# Patient Record
Sex: FEMALE | Race: WHITE
[De-identification: names, ages, dates, MRNs, and addresses within clinical notes are randomized per-mention and may not be internally consistent; named-entity substitution may affect disease eponyms.]

---

## 2018-01-02 ENCOUNTER — HOSPITAL ENCOUNTER (EMERGENCY)
Dept: HOSPITAL 25 - ED | Age: 53
Discharge: HOME | End: 2018-01-02
Payer: COMMERCIAL

## 2018-01-02 VITALS — DIASTOLIC BLOOD PRESSURE: 81 MMHG | SYSTOLIC BLOOD PRESSURE: 173 MMHG

## 2018-01-02 DIAGNOSIS — R11.10: ICD-10-CM

## 2018-01-02 DIAGNOSIS — K21.9: Primary | ICD-10-CM

## 2018-01-02 DIAGNOSIS — Z87.891: ICD-10-CM

## 2018-01-02 DIAGNOSIS — F41.9: ICD-10-CM

## 2018-01-02 LAB
HCT VFR BLD AUTO: 38 % (ref 35–47)
HGB BLD-MCNC: 12.1 G/DL (ref 12–16)
INR PPP/BLD: 1.1 (ref 0.77–1.02)
MCH RBC QN AUTO: 23 PG (ref 27–31)
MCHC RBC AUTO-ENTMCNC: 32 G/DL (ref 31–36)
MCV RBC AUTO: 72 FL (ref 80–97)
PLATELET # BLD AUTO: 198 10^3/UL (ref 150–450)
RBC # BLD AUTO: 5.3 10^6/UL (ref 4–5.4)
WBC # BLD AUTO: 13.9 10^3/UL (ref 3.5–10.8)

## 2018-01-02 PROCEDURE — 81015 MICROSCOPIC EXAM OF URINE: CPT

## 2018-01-02 PROCEDURE — 82550 ASSAY OF CK (CPK): CPT

## 2018-01-02 PROCEDURE — 83690 ASSAY OF LIPASE: CPT

## 2018-01-02 PROCEDURE — 83880 ASSAY OF NATRIURETIC PEPTIDE: CPT

## 2018-01-02 PROCEDURE — 99283 EMERGENCY DEPT VISIT LOW MDM: CPT

## 2018-01-02 PROCEDURE — 80053 COMPREHEN METABOLIC PANEL: CPT

## 2018-01-02 PROCEDURE — 85730 THROMBOPLASTIN TIME PARTIAL: CPT

## 2018-01-02 PROCEDURE — 82150 ASSAY OF AMYLASE: CPT

## 2018-01-02 PROCEDURE — 83735 ASSAY OF MAGNESIUM: CPT

## 2018-01-02 PROCEDURE — 36415 COLL VENOUS BLD VENIPUNCTURE: CPT

## 2018-01-02 PROCEDURE — 85610 PROTHROMBIN TIME: CPT

## 2018-01-02 PROCEDURE — 86140 C-REACTIVE PROTEIN: CPT

## 2018-01-02 PROCEDURE — 93005 ELECTROCARDIOGRAM TRACING: CPT

## 2018-01-02 PROCEDURE — 71045 X-RAY EXAM CHEST 1 VIEW: CPT

## 2018-01-02 PROCEDURE — 84484 ASSAY OF TROPONIN QUANT: CPT

## 2018-01-02 PROCEDURE — 83605 ASSAY OF LACTIC ACID: CPT

## 2018-01-02 PROCEDURE — 81003 URINALYSIS AUTO W/O SCOPE: CPT

## 2018-01-02 PROCEDURE — 85025 COMPLETE CBC W/AUTO DIFF WBC: CPT

## 2018-01-02 NOTE — RAD
Indication: Gastroesophageal reflux. Green emesis. Tobacco use.



Comparison: May 15, 2017 chest radiograph and July 13, 2015 abdomen CT.



Technique: RIGHT AP 1655 hours



Report: Obesity limits image quality. Upper normal heart size. Prominent ill-defined

central pulmonary vasculature and bilateral alveolar opacities and prominence of the

interstitial markings. Grossly clear pleural spaces. Negative for pneumothorax. Negative

for free air beneath the diaphragm.



IMPRESSION: The constellation of findings favors cardiogenic pulmonary edema. Correlate

with clinical assessment as bronchopneumonia could have a similar appearance.

## 2018-01-04 NOTE — ED
Mainor HARDWICK Gabriel, scribshelley for Hanh Ratliff MD on 01/02/18 at 1928 .





GI/ HPI





- HPI Summary


HPI Summary: 





This patient is a 52 year old F presenting to [facility] accompanied by her brother 

with a chief complaint of GERD exacerbation that began 10 days ago. The patient 

rates the pain 2/10 in severity. Patient reports anxiety, green emesis, and 

RIVERO. Patient denies difficulty breathing. The patient reports she has gas 

attacks. She started Prozac 2 weeks ago, takes Prilosec for GERD (BID), and 

takes a diuretic when she feels bloated. 





- History of Current Complaint


Chief Complaint: EDGeneral


Time Seen by Provider: 01/02/18 19:11


Stated Complaint: GENERAL ILLNESS


Hx Obtained From: Patient


Onset/Duration: Started Days Ago - 10, Still Present


Timing: Constant


Severity: Mild


Current Severity: Mild


Pain Intensity: 2


Associated Signs and Symptoms: Positive: Other: - anxiety, green emesis, and RIVERO





- Allergy/Home Medications


Allergies/Adverse Reactions: 


 Allergies











Allergy/AdvReac Type Severity Reaction Status Date / Time


 


Penicillins Allergy Severe Swelling Verified 07/12/15 21:47














PMH/Surg Hx/FS Hx/Imm Hx


Endocrine/Hematology History: Reports: Hx Diabetes, Hx Thyroid Disease


Cardiovascular History: Reports: Hx Congestive Heart Failure, Hx Hypertension - 

ON MEDICATION, Other Cardiovascular Problems/Disorders - hx of cardiomyopathy


Respiratory History: 


   Denies: Hx Asthma, Hx Chronic Obstructive Pulmonary Disease (COPD)


GI History: Reports: Hx Gastroesophageal Reflux Disease


Musculoskeletal History: Reports: Hx Arthritis - back, hands, feet


Sensory History: Reports: Hx Contacts or Glasses


Opthamlomology History: Reports: Hx Contacts or Glasses


Infectious Disease History: No


Infectious Disease History: 


   Denies: Traveled Outside the [place] in Last 30 Days





- Family History


Known Family History: Positive: Cardiac Disease, Hypertension, Diabetes, Other 

- cancer 


   Negative: Renal Disease, Seizure Disorder





- Social History


Alcohol Use: None


Substance Use Type: Reports: None


Smoking Status (MU): Former Smoker


Amount Used/How Often: 1/2ppd


Length of Time of Smoking/Using Tobacco: 17yrs total had stop period of time


Have You Smoked in the Last Year: Yes





Review of Systems


Positive: Vomiting - that is green , Other - GERD 


Positive: Anxious


All Other Systems Reviewed And Are Negative: Yes





Physical Exam





- Summary


Physical Exam Summary: 





VITAL SIGNS: Reviewed.


GENERAL:  Patient is a morbidly obese female who is lying comfortable in the 

stretcher. Patient is not in any acute respiratory distress.


HEAD AND FACE: No signs of trauma. No ecchymosis, hematomas or skull 

depressions. No sinus tenderness.


EYES: PERRLA, EOMI x 2, No injected conjunctiva, no nystagmus.


EARS: Hearing grossly intact. Ear canals and tympanic membranes are within 

normal limits.


MOUTH: Oropharynx within normal limits.


NECK: Supple, trachea is midline, no adenopathy, no JVD, no carotid bruit, no c-

spine tenderness, neck with full ROM.


CHEST: Symmetric, no tenderness at palpation


LUNGS: decreased breath sounds bilaterally


CVS: Regular rate and rhythm, S1 and S2 present, no murmurs or gallops 

appreciated.


ABDOMEN: Soft with epigastric tenderness. No signs of distention. No rebound no 

guarding, and no masses palpated. Bowel sounds are normal.


EXTREMITIES: FROM in all major joints, no edema, no cyanosis or clubbing.


NEURO: Alert and oriented x 3. No acute neurological deficits. Speech is normal 

and follows commands.


SKIN: Dry and warm





Triage Information Reviewed: Yes


Vital Signs On Initial Exam: 


 Initial Vitals











Temp Pulse Resp BP Pulse Ox


 


 97.8 F   82   20   207/114   96 


 


 01/02/18 13:20  01/02/18 13:20  01/02/18 13:20  01/02/18 13:20  01/02/18 13:20











Vital Signs Reviewed: Yes





- Jayna Coma Scale


Coma Scale Total: 15





Diagnostics





- Vital Signs


 Vital Signs











  Temp Pulse Resp BP Pulse Ox


 


 01/02/18 19:00   71  20  167/74  95


 


 01/02/18 18:30   74  24  167/77  93


 


 01/02/18 18:00   70  13  179/79  94


 


 01/02/18 17:20   71  15  188/92  95


 


 01/02/18 15:10  98.7 F  72  18  175/87  96


 


 01/02/18 13:20  97.8 F  82  20  207/114  96














- Laboratory


Lab Results: 


 Lab Results











  01/02/18 01/02/18 01/02/18 Range/Units





  16:25 16:25 16:25 


 


WBC     (3.5-10.8)  10^3/ul


 


RBC     (4.0-5.4)  10^6/ul


 


Hgb     (12.0-16.0)  g/dl


 


Hct     (35-47)  %


 


MCV     (80-97)  fL


 


MCH     (27-31)  pg


 


MCHC     (31-36)  g/dl


 


RDW     (10.5-15)  %


 


Plt Count     (150-450)  10^3/ul


 


MPV     (7.4-10.4)  um3


 


Immature Gran %     (0-9)  %


 


Neutrophils %     (38-83)  %


 


Band Neutrophils %     (0-8)  %


 


Lymphocytes %     (25-47)  %


 


Monocytes %     (0-13)  %


 


Abs Neuts (Manual)     (1.5-7.7)  10^3/ul


 


Normal RBC Morphology     


 


Microcytosis     


 


Macrocytosis     


 


INR (Anticoag Therapy)  1.10 H    (0.77-1.02)  


 


APTT  30.5    (26.0-36.3)  seconds


 


Sodium   135   (133-145)  mmol/L


 


Potassium   4.0   (3.5-5.0)  mmol/L


 


Chloride   99 L   (101-111)  mmol/L


 


Carbon Dioxide   30   (22-32)  mmol/L


 


Anion Gap   6   (2-11)  mmol/L


 


BUN   10   (6-24)  mg/dL


 


Creatinine   0.72   (0.51-0.95)  mg/dL


 


Est GFR ( Amer)   110.3   (>60)  


 


Est GFR (Non-Af Amer)   85.7   (>60)  


 


BUN/Creatinine Ratio   13.9   (8-20)  


 


Glucose   149 H   ()  mg/dL


 


Lactic Acid     (0.5-2.0)  mmol/L


 


Calcium   9.3   (8.6-10.3)  mg/dL


 


Magnesium   2.4   (1.9-2.7)  mg/dL


 


Total Bilirubin   1.70 H   (0.2-1.0)  mg/dL


 


AST   19   (13-39)  U/L


 


ALT   14   (7-52)  U/L


 


Alkaline Phosphatase   67   ()  U/L


 


Total Creatine Kinase   65   ()  U/L


 


Troponin I   0.00   (<0.04)  ng/mL


 


C-Reactive Protein   24.24 H   (< 5.00)  mg/L


 


B-Natriuretic Peptide    370 H ( - 100) pg/mL


 


Total Protein   6.9   (6.4-8.9)  g/dL


 


Albumin   3.9   (3.2-5.2)  g/dL


 


Globulin   3.0   (2-4)  g/dL


 


Albumin/Globulin Ratio   1.3   (1-3)  


 


Amylase   19 L   ()  U/L


 


Lipase   65   (11.0-82.0)  U/L


 


Urine Color     


 


Urine Appearance     


 


Urine pH     (5-9)  


 


Ur Specific Gravity     (1.010-1.030)  


 


Urine Protein     (Negative)  


 


Urine Ketones     (Negative)  


 


Urine Blood     (Negative)  


 


Urine Nitrate     (Negative)  


 


Urine Bilirubin     (Negative)  


 


Urine Urobilinogen     (Negative)  


 


Ur Leukocyte Esterase     (Negative)  


 


Urine WBC (Auto)     (Absent)  


 


Urine RBC (Auto)     (Absent)  


 


Ur Squamous Epith Cells     (Absent)  


 


Urine Bacteria     (Absent)  


 


Urine Glucose     (Negative)  














  01/02/18 01/02/18 01/02/18 Range/Units





  16:25 16:25 17:10 


 


WBC  13.9 H    (3.5-10.8)  10^3/ul


 


RBC  5.30    (4.0-5.4)  10^6/ul


 


Hgb  12.1    (12.0-16.0)  g/dl


 


Hct  38    (35-47)  %


 


MCV  72 L    (80-97)  fL


 


MCH  23 L    (27-31)  pg


 


MCHC  32    (31-36)  g/dl


 


RDW  17 H    (10.5-15)  %


 


Plt Count  198    (150-450)  10^3/ul


 


MPV  8    (7.4-10.4)  um3


 


Immature Gran %  9    (0-9)  %


 


Neutrophils %  76    (38-83)  %


 


Band Neutrophils %  9 H    (0-8)  %


 


Lymphocytes %  12 L    (25-47)  %


 


Monocytes %  3    (0-13)  %


 


Abs Neuts (Manual)  11.8 H    (1.5-7.7)  10^3/ul


 


Normal RBC Morphology  Not Reportable    


 


Microcytosis  1+    


 


Macrocytosis  1+    


 


INR (Anticoag Therapy)     (0.77-1.02)  


 


APTT     (26.0-36.3)  seconds


 


Sodium     (133-145)  mmol/L


 


Potassium     (3.5-5.0)  mmol/L


 


Chloride     (101-111)  mmol/L


 


Carbon Dioxide     (22-32)  mmol/L


 


Anion Gap     (2-11)  mmol/L


 


BUN     (6-24)  mg/dL


 


Creatinine     (0.51-0.95)  mg/dL


 


Est GFR ( Amer)     (>60)  


 


Est GFR (Non-Af Amer)     (>60)  


 


BUN/Creatinine Ratio     (8-20)  


 


Glucose     ()  mg/dL


 


Lactic Acid   0.9   (0.5-2.0)  mmol/L


 


Calcium     (8.6-10.3)  mg/dL


 


Magnesium     (1.9-2.7)  mg/dL


 


Total Bilirubin     (0.2-1.0)  mg/dL


 


AST     (13-39)  U/L


 


ALT     (7-52)  U/L


 


Alkaline Phosphatase     ()  U/L


 


Total Creatine Kinase     ()  U/L


 


Troponin I     (<0.04)  ng/mL


 


C-Reactive Protein     (< 5.00)  mg/L


 


B-Natriuretic Peptide    ( - 100) pg/mL


 


Total Protein     (6.4-8.9)  g/dL


 


Albumin     (3.2-5.2)  g/dL


 


Globulin     (2-4)  g/dL


 


Albumin/Globulin Ratio     (1-3)  


 


Amylase     ()  U/L


 


Lipase     (11.0-82.0)  U/L


 


Urine Color    Yellow  


 


Urine Appearance    Clear  


 


Urine pH    7.0  (5-9)  


 


Ur Specific Gravity    1.010  (1.010-1.030)  


 


Urine Protein    1+(30 mg/dl) H  (Negative)  


 


Urine Ketones    Negative  (Negative)  


 


Urine Blood    Negative  (Negative)  


 


Urine Nitrate    Negative  (Negative)  


 


Urine Bilirubin    Negative  (Negative)  


 


Urine Urobilinogen    Positive H  (Negative)  


 


Ur Leukocyte Esterase    Negative  (Negative)  


 


Urine WBC (Auto)    Trace(0-5/hpf)  (Absent)  


 


Urine RBC (Auto)    Trace(0-2/hpf)  (Absent)  


 


Ur Squamous Epith Cells    Present H  (Absent)  


 


Urine Bacteria    Absent  (Absent)  


 


Urine Glucose    Negative  (Negative)  











Result Diagrams: 


 01/02/18 16:25





 01/02/18 16:25


Lab Statement: Any lab studies that have been ordered have been reviewed, and 

results considered in the medical decision making process.





- Radiology


  ** CXR


Radiology Interpretation Completed By: Radiologist - The constellation of 

findings favors cardiogenic pulmonary edema. Correlate with clinical assessment 

as bronchopneumonia could have a similar appearance. ED physician has reviewed 

this radiology report.





- EKG


  ** 16:32


Cardiac Rate: NL


EKG Rhythm: Sinus Rhythm - at 77 BPM


EKG Interpretation: Normal axis. Normal interval. No ischemic changes. Q waves 

in inferior lead





Re-Evaluation





- Re-Evaluation


  ** First Eval


Re-Evaluation Time: 20:49


Change: Unchanged - Discussed CXR, she does not want to be admitted. She is on 

a diuretic she will go home and take follow up with PCP kirby and will get a 

cardiology consult  for echo.





GIGU Course/Dx





- Course


Assessment/Plan: This patient is a 52 year old F presenting to [facility] 

accompanied by her brother with a chief complaint of GERD exacerbation that 

began 10 days ago. The patient rates the pain 2/10 in severity. Patient reports 

anxiety, green emesis, and RIVERO. Patient denies difficulty breathing. The 

patient reports she has gas attacks. She started Prozac 2 weeks ago, takes 

Prilosec for GERD (BID), and takes a diuretic when she feels bloated.  An EKG 

reveals Normal axis. Normal interval. No ischemic changes.  CXR reveals, per 

radiologist, The constellation of findings favors cardiogenic pulmonary edema. 

Correlate.  with clinical assessment as bronchopneumonia could have a similar 

appearance.  Test results with no significant abnormalities.  In the ED course 

the patient was given Maalox, Zofran, and xylocaine.  Patient will be 

discharged with prescription for Zofran and follow up from PCP.  The patient is 

agreeable with this plan.





- Diagnoses


Provider Diagnoses: 


 GERD (gastroesophageal reflux disease)








Discharge





- Discharge Plan


Condition: Stable


Disposition: HOME


Prescriptions: 


Ondansetron [Zofran Odt] 8 mg PO TID PRN #20 tab


 PRN Reason: Nausea/Vomiting


Ondansetron HCl [Zofran 8 MG TAB] 8 mg PO TID PRN #20 tab


 PRN Reason: Nausea/Vomiting


Patient Education Materials:  Ondansetron (By mouth), Gastroesophageal Reflux 

Disease (ED)


Referrals: 


Delvin Jackson MD [Primary Care Provider] - 2 Days


Additional Instructions: 


Follow up with Dr. Jackson and he should refer you to a cardiologist for an 

echo. RETURN TO EMERGENCY DEPARTMENT FOR ANY NEW OR WORSENING SYMPTOMS 





The documentation as recorded by the Mainor nuñez Gabriel accurately reflects 

the service I personally performed and the decisions made by me, Hanh Ratliff MD.

## 2018-05-10 ENCOUNTER — HOSPITAL ENCOUNTER (INPATIENT)
Dept: HOSPITAL 25 - ED | Age: 53
LOS: 1 days | Discharge: HOME | DRG: 720 | End: 2018-05-11
Attending: HOSPITALIST | Admitting: HOSPITALIST
Payer: COMMERCIAL

## 2018-05-10 DIAGNOSIS — K21.9: ICD-10-CM

## 2018-05-10 DIAGNOSIS — E66.01: ICD-10-CM

## 2018-05-10 DIAGNOSIS — Z79.82: ICD-10-CM

## 2018-05-10 DIAGNOSIS — B96.20: ICD-10-CM

## 2018-05-10 DIAGNOSIS — I11.0: ICD-10-CM

## 2018-05-10 DIAGNOSIS — F17.210: ICD-10-CM

## 2018-05-10 DIAGNOSIS — Z79.84: ICD-10-CM

## 2018-05-10 DIAGNOSIS — N12: ICD-10-CM

## 2018-05-10 DIAGNOSIS — Z95.5: ICD-10-CM

## 2018-05-10 DIAGNOSIS — N13.30: ICD-10-CM

## 2018-05-10 DIAGNOSIS — B33.24: ICD-10-CM

## 2018-05-10 DIAGNOSIS — M19.042: ICD-10-CM

## 2018-05-10 DIAGNOSIS — M19.041: ICD-10-CM

## 2018-05-10 DIAGNOSIS — E11.9: ICD-10-CM

## 2018-05-10 DIAGNOSIS — M19.072: ICD-10-CM

## 2018-05-10 DIAGNOSIS — A41.9: Primary | ICD-10-CM

## 2018-05-10 DIAGNOSIS — Z83.3: ICD-10-CM

## 2018-05-10 DIAGNOSIS — M46.90: ICD-10-CM

## 2018-05-10 DIAGNOSIS — Z88.1: ICD-10-CM

## 2018-05-10 DIAGNOSIS — E78.5: ICD-10-CM

## 2018-05-10 DIAGNOSIS — K57.90: ICD-10-CM

## 2018-05-10 DIAGNOSIS — M19.071: ICD-10-CM

## 2018-05-10 DIAGNOSIS — I50.9: ICD-10-CM

## 2018-05-10 DIAGNOSIS — Z82.49: ICD-10-CM

## 2018-05-10 DIAGNOSIS — Z80.9: ICD-10-CM

## 2018-05-10 DIAGNOSIS — I25.10: ICD-10-CM

## 2018-05-10 DIAGNOSIS — Z88.0: ICD-10-CM

## 2018-05-10 LAB
BASOPHILS # BLD AUTO: 0.1 10^3/UL (ref 0–0.2)
EOSINOPHIL # BLD AUTO: 0.1 10^3/UL (ref 0–0.6)
HCT VFR BLD AUTO: 37 % (ref 35–47)
HGB BLD-MCNC: 12.2 G/DL (ref 12–16)
LYMPHOCYTES # BLD AUTO: 1 10^3/UL (ref 1–4.8)
MCH RBC QN AUTO: 26 PG (ref 27–31)
MCHC RBC AUTO-ENTMCNC: 33 G/DL (ref 31–36)
MCV RBC AUTO: 79 FL (ref 80–97)
MONOCYTES # BLD AUTO: 0.6 10^3/UL (ref 0–0.8)
NEUTROPHILS # BLD AUTO: 15.7 10^3/UL (ref 1.5–7.7)
NRBC # BLD AUTO: 0 10^3/UL
NRBC BLD QL AUTO: 0
PLATELET # BLD AUTO: 205 10^3/UL (ref 150–450)
RBC # BLD AUTO: 4.72 10^6/UL (ref 4–5.4)
WBC # BLD AUTO: 17.4 10^3/UL (ref 3.5–10.8)

## 2018-05-10 PROCEDURE — 74176 CT ABD & PELVIS W/O CONTRAST: CPT

## 2018-05-10 PROCEDURE — 80048 BASIC METABOLIC PNL TOTAL CA: CPT

## 2018-05-10 PROCEDURE — 83605 ASSAY OF LACTIC ACID: CPT

## 2018-05-10 PROCEDURE — 83036 HEMOGLOBIN GLYCOSYLATED A1C: CPT

## 2018-05-10 PROCEDURE — 87086 URINE CULTURE/COLONY COUNT: CPT

## 2018-05-10 PROCEDURE — 85025 COMPLETE CBC W/AUTO DIFF WBC: CPT

## 2018-05-10 PROCEDURE — 83690 ASSAY OF LIPASE: CPT

## 2018-05-10 PROCEDURE — 80053 COMPREHEN METABOLIC PANEL: CPT

## 2018-05-10 PROCEDURE — 90732 PPSV23 VACC 2 YRS+ SUBQ/IM: CPT

## 2018-05-10 PROCEDURE — 36415 COLL VENOUS BLD VENIPUNCTURE: CPT

## 2018-05-10 PROCEDURE — 81003 URINALYSIS AUTO W/O SCOPE: CPT

## 2018-05-10 PROCEDURE — 99284 EMERGENCY DEPT VISIT MOD MDM: CPT

## 2018-05-10 PROCEDURE — 87077 CULTURE AEROBIC IDENTIFY: CPT

## 2018-05-10 PROCEDURE — 87186 SC STD MICRODIL/AGAR DIL: CPT

## 2018-05-10 PROCEDURE — 81015 MICROSCOPIC EXAM OF URINE: CPT

## 2018-05-10 PROCEDURE — 87040 BLOOD CULTURE FOR BACTERIA: CPT

## 2018-05-10 PROCEDURE — 86140 C-REACTIVE PROTEIN: CPT

## 2018-05-10 RX ADMIN — SODIUM CHLORIDE SCH MLS/HR: 900 IRRIGANT IRRIGATION at 18:11

## 2018-05-10 RX ADMIN — CARVEDILOL SCH MG: 25 TABLET, FILM COATED ORAL at 18:11

## 2018-05-10 RX ADMIN — CARVEDILOL SCH: 25 TABLET, FILM COATED ORAL at 20:55

## 2018-05-10 RX ADMIN — TICAGRELOR SCH MG: 90 TABLET ORAL at 18:11

## 2018-05-10 RX ADMIN — TICAGRELOR SCH: 90 TABLET ORAL at 20:55

## 2018-05-10 RX ADMIN — INSULIN LISPRO SCH UNITS: 100 INJECTION, SOLUTION INTRAVENOUS; SUBCUTANEOUS at 21:10

## 2018-05-10 RX ADMIN — ACETAMINOPHEN PRN MG: 325 TABLET ORAL at 21:09

## 2018-05-10 NOTE — RAD
CLINICAL HISTORY: Right flank pain in a patient with a history of renal stones



COMPARISON: Most recent CT examination is dated July 13, 2015



TECHNIQUE: Noncontrast CT examination of the abdomen and pelvis from the lung bases

through the initial tuberosities.



FINDINGS:



VISUALIZED LUNG BASES: 

The visualized lung bases are grossly clear. There is no pleural effusion.



ABDOMEN AND PELVIS:



Evaluation of the solid organs and vasculature is limited without intravenous contrast.



Overlying the right anterior abdominal wall there is appearance of a lipoma measuring up

to 5.4 x 14 cm in the axial plane with multiple foci of coarse calcification. A

fat-containing umbilical hernia is again seen.



The liver is homogenously hypodense relative to the spleen. The liver measures 20 cm in

greatest cephalocaudal dimension, slightly less than the previous CT examination.



The spleen, pancreas and adrenal glands are grossly normal in appearance. The gallbladder

is normal.



The left kidney normal in appearance without focal mass, calcification or signs of

hydronephrosis. Right kidney exhibits a mild degree of hydronephrosis. There are no

obstructing calculi identified in the right ureter or in the urinary bladder.



Evaluation of the gastrointestinal tract is limited without oral contrast. The small and

large bowel are not distended.The patient's normal appendix is identified in the right

lower quadrant measuring 5 mm in diameter (coronal image 52 and axial image 131) with gas

in the lumen. There are rectosigmoid diverticula but no focal inflammatory change

characteristic of diverticulitis.



There is no gross retroperitoneal or mesenteric lymphadenopathy.



The pelvic viscera is normal in appearance.



The coarsely calcified abdominal aorta and iliac arteries are normal in course and

diameter.



Degenerative changes include multilevel loss of intervertebral disc height involving the

lower thoracic and lumbar spine.There are no sinister bone lesions.



IMPRESSION:

1. There is mild hydronephrosis of the right kidney with mild perinephric stranding but no

obstructing renal calculi is identified. Images could be seen in the setting of recent

passage of renal calculus.

2. Diverticulosis without focal inflammatory changes characteristic of diverticulitis.

3. Likely hepatic steatosis with mild splenomegaly.

4. Additional chronic and degenerative changes described in body the report unlikely to be

directly related to the patient's current presentation.

## 2018-05-11 VITALS — DIASTOLIC BLOOD PRESSURE: 53 MMHG | SYSTOLIC BLOOD PRESSURE: 103 MMHG

## 2018-05-11 LAB
BASOPHILS # BLD AUTO: 0.1 10^3/UL (ref 0–0.2)
EOSINOPHIL # BLD AUTO: 0.2 10^3/UL (ref 0–0.6)
HCT VFR BLD AUTO: 34 % (ref 35–47)
HGB BLD-MCNC: 11.2 G/DL (ref 12–16)
LYMPHOCYTES # BLD AUTO: 2.2 10^3/UL (ref 1–4.8)
MCH RBC QN AUTO: 26 PG (ref 27–31)
MCHC RBC AUTO-ENTMCNC: 33 G/DL (ref 31–36)
MCV RBC AUTO: 79 FL (ref 80–97)
MONOCYTES # BLD AUTO: 0.7 10^3/UL (ref 0–0.8)
NEUTROPHILS # BLD AUTO: 8.3 10^3/UL (ref 1.5–7.7)
NRBC # BLD AUTO: 0 10^3/UL
NRBC BLD QL AUTO: 0
PLATELET # BLD AUTO: 184 10^3/UL (ref 150–450)
RBC # BLD AUTO: 4.25 10^6/UL (ref 4–5.4)
WBC # BLD AUTO: 11.4 10^3/UL (ref 3.5–10.8)

## 2018-05-11 RX ADMIN — INSULIN LISPRO SCH UNITS: 100 INJECTION, SOLUTION INTRAVENOUS; SUBCUTANEOUS at 13:21

## 2018-05-11 RX ADMIN — TICAGRELOR SCH MG: 90 TABLET ORAL at 08:11

## 2018-05-11 RX ADMIN — SODIUM CHLORIDE SCH MLS/HR: 900 IRRIGANT IRRIGATION at 04:12

## 2018-05-11 RX ADMIN — CARVEDILOL SCH MG: 25 TABLET, FILM COATED ORAL at 08:11

## 2018-05-11 RX ADMIN — ACETAMINOPHEN PRN MG: 325 TABLET ORAL at 07:59

## 2018-05-11 RX ADMIN — INSULIN LISPRO SCH UNITS: 100 INJECTION, SOLUTION INTRAVENOUS; SUBCUTANEOUS at 09:26

## 2018-05-11 NOTE — ED
Mainor HARDWICK Gabriel, scribed for Vj Devine MD on 05/10/18 at 1157 .





Abdominal Pain/Male





- HPI Summary


HPI Summary: 





This patient is a 52 year old F presenting to [facility] with a chief complaint of 

right flank pain that began yesterday morning.  The patient rates the pain 8/10 

in severity and states it radiates into her ABD. Patient reports nausea, 

hematuria, dysuria, diaphoresis, and chills. Pt is on abx for her current UTI, 

she states the pain feels similar to a prior kidney stone. Cardiac stent placed 

feb 2018





- History of Current Complaint


Chief Complaint: EDFlankPain


Stated Complaint: N/V,RT FLANK PAIN


Time Seen by Provider: 05/10/18 11:28


Hx Obtained From: Patient


Onset/Duration: Still Present


Timing: Constant


Severity Initially: Severe


Severity Currently: Severe


Pain Intensity: 8


Pain Scale Used: 0-10 Numeric


Location: Flank - r


Radiates: No


Associated Signs And Symptoms: Positive: Nausea





- Allergies/Home Medications


Allergies/Adverse Reactions: 


 Allergies











Allergy/AdvReac Type Severity Reaction Status Date / Time


 


clarithromycin Allergy  Diarrhea Verified 05/10/18 11:42


 


penicillin G Allergy  Swelling Verified 05/10/18 11:42











Home Medications: 


 Home Medications





Aspirin EC TAB* [Ecotrin EC Low Dose 81 MG*] 81 mg PO QAM 05/10/18 [History 

Confirmed 05/10/18]


Atorvastatin* [Lipitor*] 10 mg PO DAILY 05/10/18 [History Confirmed 05/10/18]


Bumetanide TAB* [Bumex 1 MG TAB*] 1 mg PO DAILY 05/10/18 [History Confirmed 05/

10/18]


Carvedilol TAB* [Coreg TAB*] 25 mg PO BID 05/10/18 [History Confirmed 05/10/18]


Glyburid/Metformin 2.5/500(NF) [Glucovance (NF)] 1 tab PO BID 05/10/18 [History 

Confirmed 05/10/18]


Levothyroxine TAB* [Synthroid TAB*] 100 mcg PO DAILY 05/10/18 [History 

Confirmed 05/10/18]


Lisinopril TAB* [Prinivil TAB*] 40 mg PO DAILY 05/10/18 [History Confirmed 05/10

/18]


Nitrofurantoin Monohyd/M-Cryst [Macrobid 100 mg Capsule] 100 mg PO BID 05/10/18 

[History Confirmed 05/10/18]


Omeprazole CAP* [Prilosec CAP* 20 MG] 20 mg PO DAILY 05/10/18 [History 

Confirmed 05/10/18]


Ticagrelor* [Brilinta*] 90 mg PO BID 05/10/18 [History Confirmed 05/10/18]


amLODIPine TAB* [Norvasc 5 mg TAB*] 10 mg PO DAILY 05/10/18 [History Confirmed 

05/10/18]











PMH/Surg Hx/FS Hx/Imm Hx


Endocrine/Hematology History: Reports: Hx Diabetes, Hx Thyroid Disease


Cardiovascular History: Reports: Hx Congestive Heart Failure, Hx Hypertension - 

ON MEDICATION, Other Cardiovascular Problems/Disorders - hx of cardiomyopathy


Respiratory History: 


   Denies: Hx Asthma, Hx Chronic Obstructive Pulmonary Disease (COPD)


GI History: Reports: Hx Gastroesophageal Reflux Disease


Musculoskeletal History: Reports: Hx Arthritis - back, hands, feet


Sensory History: Reports: Hx Contacts or Glasses


Opthamlomology History: Reports: Hx Contacts or Glasses


Infectious Disease History: No


Infectious Disease History: 


   Denies: Traveled Outside the [place] in Last 30 Days





- Family History


Known Family History: Positive: Cardiac Disease, Hypertension, Diabetes, Other 

- cancer 


   Negative: Renal Disease, Seizure Disorder





- Social History


Alcohol Use: None


Substance Use Type: Reports: None


Smoking Status (MU): Former Smoker


Amount Used/How Often: 1/2ppd


Length of Time of Smoking/Using Tobacco: 17yrs total had stop period of time


Have You Smoked in the Last Year: Yes





Review of Systems


Positive: Chills, Skin Diaphoresis.  Negative: Fever


Negative: Erythema


Negative: Sore Throat


Negative: Chest Pain


Negative: Shortness Of Breath, Cough


Positive: Abdominal Pain, Nausea


Positive: dysuria, flank pain - r, hematuria


Negative: Myalgia, Edema


Negative: Rash


Neurological: Negative - dizziness 


All Other Systems Reviewed And Are Negative: Yes





Physical Exam





- Summary


Physical Exam Summary: 








Constitutional: Well-developed, obese, in moderate pain distress 


Skin: Warm, Dry


HENT: Normocephalic; Atraumatic


Eyes: Conjunctiva normal


Neck: Musculoskeletal ROM normal neck. (-) JVD, (-) Stridor, (-) Tracheal 

deviation


Cardio: Rhythm regular, rate normal, Heart sounds normal; Intact distal pulses; 

The pedal pulses are 2+ and symmetric. Radial pulses are 2+ and symmetric. (-) 

Murmur


Pulmonary/Chest wall: Effort normal. (-) Respiratory distress, (-) Wheezes, (-) 

Rales


Abd: Soft, (-) Tenderness, (-) Distension, (-) Guarding, (-) Rebound


Musculoskeletal: (-) Edema


Lymph: (-) Cervical adenopathy


Neuro: Alert, Oriented x3


Psych: Mood and affect Normal


 





Triage Information Reviewed: Yes


Vital Signs On Initial Exam: 


 Initial Vitals











Temp Pulse Resp BP Pulse Ox


 


 97 F   78   16   168/91   99 


 


 05/10/18 10:28  05/10/18 10:28  05/10/18 10:28  05/10/18 10:28  05/10/18 10:28











Vital Signs Reviewed: Yes





Diagnostics





- Vital Signs


 Vital Signs











  Temp Pulse Resp BP Pulse Ox


 


 05/10/18 10:28  97 F  78  16  168/91  99














- Laboratory


Result Diagrams: 


 05/10/18 11:46





 05/10/18 11:39


Lab Statement: Any lab studies that have been ordered have been reviewed, and 

results considered in the medical decision making process.





- Radiology


  ** CT ABD/Pelvis


Radiology Interpretation Completed By: Radiologist - 1. There is mild 

hydronephrosis of the right kidney with mild perinephric stranding but no 

obstructing renal calculi is identified. Images could be seen in the setting of 

recent passage of renal calculus. 2. Diverticulosis without focal inflammatory 

changes characteristic of diverticulitis. 3. Likely hepatic steatosis with mild 

splenomegaly. 4. Additional chronic and degenerative changes described in body 

the report unlikely to be directly related to the patient's current 

presentation. ED physician has reviewed this radiology report.





Re-Evaluation





- Re-Evaluation


  ** First Eval


Re-Evaluation Time: 15:32


Change: Unchanged


Comment: I updated the patient with the plan of care.





Abdominal Pain Fem Course/Dx





- Course


Assessment/Plan: This patient is a 52 year old F presenting to [facility] with a 

chief complaint of right flank pain that began yesterday morning.  The patient 

rates the pain 8/10 in severity and states it radiates into her ABD. Patient 

reports nausea, hematuria, dysuria, diaphoresis, and chills. Pt is on abx for 

her current UTI, she states the pain feels similar to a prior kidney stone. 

Cardiac stent placed feb 2018.  CT ABD/Pelvis reveals, per radiologist, 1. 

There is mild hydronephrosis of the right kidney with mild perinephric 

stranding but no.  obstructing renal calculi is identified. Images could be 

seen in the setting of recent.  passage of renal calculus.  2. Diverticulosis 

without focal inflammatory changes characteristic of diverticulitis.  3. Likely 

hepatic steatosis with mild splenomegaly.  4. Additional chronic and 

degenerative changes described in body the report unlikely to be.  directly 

related to the patient's current presentation.  Test results with no 

significant abnormalities except for a glucose of 186 and WBC 17.4.  In the ED 

course the patient was given zofran and morphine.  We discussed patient care 

with Dr. Monroy and they agreed to admit the patient. The patient will be 

admitted because her outpatient treatment is failing due to her DM.  Patient 

will be admitted.  The patient is agreeable with this plan.





- Diagnoses


Provider Diagnoses: 


 Pyelonephritis








- Provider Notifications


Discussed Care Of Patient With: Gaetano Monroy


Time Discussed With Above Provider: 14:45


Instructed by Provider To: Admit As Inpatient





Discharge





- Sign-Out/Discharge


Documenting (check all that apply): Discharge/Admit/Transfer





- Discharge Plan


Condition: Fair


Disposition: ADMITTED TO [facility]


Referrals: 


Delvin Jackson MD [Primary Care Provider] - 





The documentation as recorded by the Mainor nuñez Gabriel accurately reflects 

the service I personally performed and the decisions made by me, Vj Devine MD.

## 2018-05-11 NOTE — HP
CC:  Dr. Jackson; Dr. Mitchell *

 

HISTORY AND PHYSICAL:

 

DATE OF ADMISSION:  05/10/18

 

TIME OF EVALUATION:  3:45 p.m.

 

PRIMARY CARE PROVIDER:  Dr. Jackson.

 

CARDIOLOGIST:  Dr. Mitchell.

 

CHIEF COMPLAINT:  Right flank pain.

 

HISTORY OF PRESENT ILLNESS:  Ms. Macdonald is a 52-year-old lady with a past 
medical history of morbid obesity with a BMI of 54, diabetes, hypertension, 
history of viral cardiomyopathy, GERD, coronary artery disease status post 
stent who presents to the emergency room with complaints of right flank pain.  
She states that 5 days ago she started to have dysuria and urinary frequency.  
She suspected that she had a urinary tract infection, but did not seek medical 
attention during the weekend. On Monday, she started to have intermittent 
hematuria and decided to see her primary care provider.  In the meantime, she 
developed right flank pain with no radiation, described as moderate-to-severe, 
associated with nausea and vomiting. She was seen at her primary care provider'
s office, had a urine test performed and was diagnosed with urinary tract 
infection.  Nitrofurantoin was prescribed and the patient did not  the 
medication the same day.  She started this antibiotic yesterday and took 2 
doses with no significant improvement what prompted her visit to the emergency 
room today.

 

She denies fever, chills, chest pain, palpitations, shortness of breath, or any 
other complaints not described above.

 

She denies any prior history of nephrolithiasis, but has had uncomplicated 
urinary tract infections in the past.

 

PAST MEDICAL HISTORY:

1.  Morbid obesity with a BMI of 54.

2.  Coronary artery disease.  As per the patient's report, she had a stent 
placed to the LAD in February 2018 by Dr. Camarena at [facility].  She states 
that her RCA was totally occluded and no intervention was performed.  She has 
been on aspirin and Brilinta since.

3.  Type 2 diabetes.

4.  Hypertension.

5.  Hyperlipidemia.

6.  GERD.

7.  Prior history of viral cardiomyopathy.

 

MEDICATION LIST:

1.  Amlodipine 10 mg p.o. daily.

2.  Aspirin 81 mg p.o. daily.

3.  Atorvastatin 10 mg p.o. daily.

4.  Bumetanide 1 mg p.o. daily.

5.  Carvedilol 25 mg p.o. b.i.d.

6.  Glyburide/metformin 2.5/500 one tablet p.o. b.i.d.

7.  Levothyroxine 100 mcg p.o. daily.

8.  Lisinopril 40 mg p.o. daily.

9.  Nitrofurantoin 100 mg p.o. b.i.d.

10.  Omeprazole 20 mg p.o. daily.

11.  Brilinta 90 mg p.o. b.i.d.

 

ALLERGIES:  With PENICILLINS, the patient described reaction of swelling and 
with CLARITHROMYCIN, the patient has diarrhea.  She received ceftriaxone in the 
emergency room with no untoward reaction.

 

FAMILY HISTORY:  Significant for strong family history of coronary artery 
disease.

 

SOCIAL HISTORY:  The patient has a history of tobacco abuse, half-a-pack of 
cigarettes and she is trying to quit.  No history of alcohol or drug use. 
Surrogate decision maker is her brother, Jerry Macdonald, phone number [phone].

 

REVIEW OF SYSTEMS:  A 14-point review of systems was performed and all the 
pertinent negative and positive findings are in the HPI.

 

                               PHYSICAL EXAMINATION

 

GENERAL:  The patient is a pleasant, middle-aged morbidly obese lady, sitting 
up in a chair, in no acute distress.

 

VITAL SIGNS:  Temperature 97.0, heart rate is 78, respiratory rate is 16, 
oxygen saturation is 99% on room air, blood pressure is 168/91.

 

HEENT:  Pupils are equal.  Moist mucous membranes.

 

CHEST:  Breath sounds present bilaterally with no added sounds.

 

CVS:  Normal S1, S2.  Regular rate and rhythm.

 

ABDOMEN:  The patient was able to lie down on the ED stretcher, but abdominal 
examination is limited due to body habitus.  There is no significant tenderness 
on palpation.  Bowel sounds are present and there is right flank tenderness on 
percussion.

 

EXTREMITIES:  Mild bilateral lower extremity pitting edema.

 

NEURO:  She is alert, oriented x3.  Able to move all 4 extremities.

 

 LABORATORY AND IMAGING DATA:  The patient had a CBC that showed a WBC of 17.4, 
hemoglobin of 12.2, hematocrit of 37, platelets of 205, 90% neutrophils.  
Chemistry showed a sodium of 136, potassium of 4, chloride of 100, bicarb of 24
, BUN of 17, anion gap of 12, glucose of 186, calcium of 9.6, lactic acid of 
1.4.  LFTs are normal except for slight elevation of total bilirubin at 1.4.  
CRP is 43. Urinalysis showed 1+ protein, 1+ ketones, 3+ blood, 3+ LE, 3+ wbc's, 
3+ rbc's, squamous epithelial cells are present, and 1+ urine bacteria.

 

CT of the abdomen and pelvis showed mild hydronephrosis of the right kidney 
with mild perinephric stranding, but no obstructing renal calculi is 
identified.  Images could be seen in the setting of recent passage of renal 
calculus.  Diverticulosis without focal inflammatory changes characteristic of 
diverticulitis.  Likely hepatic steatosis with mild splenomegaly.

 

ASSESSMENT AND PLAN:  Ms. Macdonald is a 52-year-old lady with a past medical 
history of morbid obesity, coronary artery disease, status post stent, 
hypertension, hyperlipidemia, type 2 diabetes, who presents to the emergency 
room with complaints of flank pain, dysuria and urinary frequency, found to 
have pyelonephritis.

 

1.  Sepsis.  The patient has leukocytosis and I believe she is not tachycardic 
at this time because she is on a beta-blocker.  Source is pyelonephritis.  
There is no evidence of severe sepsis or septic shock at this time.

2.  Pyelonephritis.  We are going to obtain the urine culture the patient had 
done as an outpatient.  I suspect the urine culture sent here will probably be 
negative as she was already taking antibiotics as an outpatient.  In the 
meantime, she will be continued on IV fluids and ceftriaxone.  She will receive 
symptomatic treatment for her pain and nausea.

3.  Hypertension.  I am going to continue her amlodipine, carvedilol, and 
lisinopril with holding parameters.

4.  Coronary artery disease, appears to be stable.  I am going to obtain 
records from [facility] regarding her cardiac cath and last visit with Dr. Mitchell.

5.  Type 2 diabetes.  I am going to hold her glyburide/metformin in the setting 
of acute infection.  She is going to have fingersticks a.c. and h.s. with a 
lispro sliding scale and she may require low dose Lantus depending on her 
glucose.

6.  Hyperlipidemia.  Continue atorvastatin.

7.  DVT prophylaxis:  The patient has a score of 2 and she will be started on 
subcutaneous heparin.

8.  Code status is full.

 

TIME SPENT:  Approximately 50 minutes was spent with the patient's interview, 
medical records review, physical examination to complete this admission, more 
than half of this time was spent face-to-face with the patient and coordination 
of care.

 

 

 

[id number]/[facility]PS #: [id number]

SHAD

## 2018-05-12 NOTE — DS
CC:  Dr. Jackson; Dr. Mitchell

 

DISCHARGE SUMMARY:

 

DATE OF ADMISSION:  05/10/18

 

DATE OF DISCHARGE:  05/11/18

 

PRIMARY CARE PROVIDER:  Dr. Jackson.

 

CARDIOLOGIST:  Dr. Mitchell.

 

DISCHARGE DIAGNOSES:

1.  Sepsis.

2.  Pyelonephritis.

 

SECONDARY DIAGNOSES:

1.  Morbid obesity with a BMI of 54.

2.  Coronary artery disease, status post cardiac cath in February 2018 with complete occlusion of the
 RCA and 85% LAD lesion, status post drug-eluting stent, on aspirin and Brilinta.

3.  Type 2 diabetes.

4.  Hypertension.

5.  Hyperlipidemia.

6.  Gastroesophageal reflux disease.

7.  Prior history of viral cardiomyopathy in the early 2000 with ejection fraction less than 10%, marlene
t recovered to ejection fraction of 50%.

 

MEDICATIONS AT THE TIME OF DISCHARGE:

1.  Bumetanide 1 mg p.o. daily.

2.  Omeprazole 20 mg p.o. daily.

3.  Lisinopril 40 mg p.o. daily.

4.  Levothyroxine 100 mcg p.o. daily.

5.  Carvedilol 25 mg p.o. b.i.d.

6.  Amlodipine 10 mg p.o. daily.

7.  Ticagrelor 90 mg p.o. b.i.d.

8.  Glyburide/metformin 2.5/500 one tab p.o. b.i.d.

9.  Atorvastatin 10 mg p.o. daily.

10.  Aspirin 81 mg p.o. q.a.m.

 

New medications:

 

1.  Ciprofloxacin 500 mg p.o. q.12 hours for 10 days more.

2.  Tylenol 650 mg p.o. q.6 hours p.r.n. pain or fever.

 

HOSPITAL COURSE:  Ms. Macdonald is a 52-year-old lady with past medical history as stated above that pr
esents to the emergency room with complaints of right flank pain.  For more details about her present
ation, I refer you to her history and physical.

 

The patient was admitted with an impression of sepsis secondary to pyelonephritis. CT of the abdomen 
and pelvis without contrast showed mild hydronephrosis of the right kidney with mild perinephric stra
nding, but no obstructing renal calculi was identified.  Images could be seen in the setting of recen
t passage of renal calculus.  Urinalysis was abnormal and urine culture done in our facility is growi
ng E. coli.  Urine culture done at [facility] also grew E. coli and this was multi-sensitive, so she is 
being discharged on ciprofloxacin.

 

The patient had significant improvement of her symptoms.  Her leukocytosis went down from 17 to 11 an
d she is feeling well enough to request discharge.

 

She will be discharged home today to follow up with Dr. Jackson next week.

 

PHYSICAL EXAMINATION:  Vital Signs:  Temperature 97.7, heart rate 59, respiratory rate 18, oxygen sat
uration 95% on room air, blood pressure 103/53.  General:  The patient is a pleasant, morbidly obese,
 middle-aged lady, sitting up in bed, in no acute distress.  CVS:  Normal S1, S2.  Regular rate and r
hythm.  Chest:  Breath sounds present bilaterally with no added sounds.  Abdomen:  Obese.  Bowel soun
ds are present and exam is limited due to body habitus.  There is mild right CVA tenderness, improved
 from my examination yesterday.  Neuro:  She is alert and oriented x3.  Able to move all 4 extremitie
s.

 

DIET:  Heart-healthy consistent carb diet.

 

ACTIVITY:  As tolerated.

 

DISPOSITION:  To home.

 

STATUS WHILE IN THE HOSPITAL:  Inpatient. Please keep in mind, this is a summarized version of this p
atient's hospital stay. If you need more information, please feel free to call me at [phone] or 
please obtain the full medical records.

 

TIME SPENT:  Approximately 45 minutes were spent to complete this discharge.

 

 [id number]/CPS #: [id number]

## 2019-04-04 ENCOUNTER — HOSPITAL ENCOUNTER (OUTPATIENT)
Dept: HOSPITAL 25 - ED | Age: 54
Setting detail: OBSERVATION
LOS: 1 days | Discharge: HOME | End: 2019-04-05
Attending: INTERNAL MEDICINE | Admitting: HOSPITALIST
Payer: COMMERCIAL

## 2019-04-04 DIAGNOSIS — E66.01: ICD-10-CM

## 2019-04-04 DIAGNOSIS — R68.83: ICD-10-CM

## 2019-04-04 DIAGNOSIS — I25.10: ICD-10-CM

## 2019-04-04 DIAGNOSIS — A41.9: ICD-10-CM

## 2019-04-04 DIAGNOSIS — K21.9: ICD-10-CM

## 2019-04-04 DIAGNOSIS — N17.9: ICD-10-CM

## 2019-04-04 DIAGNOSIS — Z79.84: ICD-10-CM

## 2019-04-04 DIAGNOSIS — Z79.82: ICD-10-CM

## 2019-04-04 DIAGNOSIS — Z87.891: ICD-10-CM

## 2019-04-04 DIAGNOSIS — E11.649: Primary | ICD-10-CM

## 2019-04-04 DIAGNOSIS — I10: ICD-10-CM

## 2019-04-04 DIAGNOSIS — N39.0: ICD-10-CM

## 2019-04-04 DIAGNOSIS — Z87.442: ICD-10-CM

## 2019-04-04 DIAGNOSIS — R11.0: ICD-10-CM

## 2019-04-04 LAB
ALBUMIN SERPL BCG-MCNC: 3.8 G/DL (ref 3.2–5.2)
ALBUMIN/GLOB SERPL: 1.1 {RATIO} (ref 1–3)
ALP SERPL-CCNC: 54 U/L (ref 34–104)
ALT SERPL W P-5'-P-CCNC: 25 U/L (ref 7–52)
AMYLASE SERPL-CCNC: 24 U/L (ref 29–103)
ANION GAP SERPL CALC-SCNC: 14 MMOL/L (ref 2–11)
AST SERPL-CCNC: 72 U/L (ref 13–39)
BASOPHILS # BLD AUTO: 0 10^3/UL (ref 0–0.2)
BUN SERPL-MCNC: 25 MG/DL (ref 6–24)
BUN/CREAT SERPL: 16.2 (ref 8–20)
CALCIUM SERPL-MCNC: 8.6 MG/DL (ref 8.6–10.3)
CHLORIDE SERPL-SCNC: 93 MMOL/L (ref 101–111)
EOSINOPHIL # BLD AUTO: 0.1 10^3/UL (ref 0–0.6)
GLOBULIN SER CALC-MCNC: 3.6 G/DL (ref 2–4)
GLUCOSE SERPL-MCNC: 54 MG/DL (ref 70–100)
HCO3 SERPL-SCNC: 19 MMOL/L (ref 22–32)
HCT VFR BLD AUTO: 37 % (ref 33–41)
HGB BLD-MCNC: 12.1 G/DL (ref 12–16)
LYMPHOCYTES # BLD AUTO: 1 10^3/UL (ref 1–4.8)
MAGNESIUM SERPL-MCNC: 2.1 MG/DL (ref 1.9–2.7)
MCH RBC QN AUTO: 24 PG (ref 27–31)
MCHC RBC AUTO-ENTMCNC: 33 G/DL (ref 31–36)
MCV RBC AUTO: 75 FL (ref 80–97)
MONOCYTES # BLD AUTO: 1.3 10^3/UL (ref 0–0.8)
NEUTROPHILS # BLD AUTO: 13.8 10^3/UL (ref 1.5–7.7)
NRBC # BLD AUTO: 0 10^3/UL
NRBC BLD QL AUTO: 0
PLATELET # BLD AUTO: 183 10^3/UL (ref 150–450)
POTASSIUM SERPL-SCNC: 5.7 MMOL/L (ref 3.5–5)
PROT SERPL-MCNC: 7.4 G/DL (ref 6.4–8.9)
RBC # BLD AUTO: 4.97 10^6 /UL (ref 3.7–4.87)
RBC UR QL AUTO: (no result)
SODIUM SERPL-SCNC: 126 MMOL/L (ref 135–145)
WBC # BLD AUTO: 16.1 10^3/UL (ref 3.5–10.8)
WBC UR QL AUTO: (no result)

## 2019-04-04 PROCEDURE — 36415 COLL VENOUS BLD VENIPUNCTURE: CPT

## 2019-04-04 PROCEDURE — 83735 ASSAY OF MAGNESIUM: CPT

## 2019-04-04 PROCEDURE — 87086 URINE CULTURE/COLONY COUNT: CPT

## 2019-04-04 PROCEDURE — 85025 COMPLETE CBC W/AUTO DIFF WBC: CPT

## 2019-04-04 PROCEDURE — G0378 HOSPITAL OBSERVATION PER HR: HCPCS

## 2019-04-04 PROCEDURE — 93005 ELECTROCARDIOGRAM TRACING: CPT

## 2019-04-04 PROCEDURE — 82150 ASSAY OF AMYLASE: CPT

## 2019-04-04 PROCEDURE — 99284 EMERGENCY DEPT VISIT MOD MDM: CPT

## 2019-04-04 PROCEDURE — 83605 ASSAY OF LACTIC ACID: CPT

## 2019-04-04 PROCEDURE — 83690 ASSAY OF LIPASE: CPT

## 2019-04-04 PROCEDURE — 81015 MICROSCOPIC EXAM OF URINE: CPT

## 2019-04-04 PROCEDURE — 80053 COMPREHEN METABOLIC PANEL: CPT

## 2019-04-04 PROCEDURE — 81003 URINALYSIS AUTO W/O SCOPE: CPT

## 2019-04-04 PROCEDURE — 76775 US EXAM ABDO BACK WALL LIM: CPT

## 2019-04-04 PROCEDURE — 87040 BLOOD CULTURE FOR BACTERIA: CPT

## 2019-04-04 NOTE — ED
HPI Diabetic





- HPI Summary


HPI Summary: 





This pt is a 54 y/o female, with hx of DM, [facility] 

glucose today. Pt reports her sugar keeps dropping down today despite taking 

Metformin. She only took 1 pill today and normally takes 2 pills. She states 

her blood glucose was 54 at noon today and went up to 90 after eating. Pt notes 

her blood glucose was about 47 1.5 hours ago around 1900.


Pt reports passing a kidney stone 3 days ago on Monday night at home. Although 

she did not see the kidney stone, she states she passed it because she had pain 

before and after she passed it her pain stopped.  


She went to Urgent Care yesterday and was dx kidney infection and was given 

Ciprofloxacin. She states she only took 1 dose this morning. 


Currently she reports decreased appetite, decreased PO intake, nausea, back pain

, and chills. Pt notes decreased appetite due to not feeling well. Denies fever

, chest pain, SOB. She has been taking Advil for her pain with mild relief, 

last time was this morning. 





- History Of Current Complaint


Chief Complaint: EDGeneral


Time Seen by Provider: 04/04/19 20:11


Hx Obtained From: Patient


Onset/Duration: Lasting Hours, Still Present


Timing: Hours


Severity Currently: Mild


Character: Alert


Aggravating: Nothing


Alleviating: Nothing


Associated Signs & Symptoms: Chills, Nausea


Related History: Compliant, DM II





- Allergies/Home Medications


Allergies/Adverse Reactions: 


 Allergies











Allergy/AdvReac Type Severity Reaction Status Date / Time


 


clarithromycin Allergy  Diarrhea Verified 04/03/19 10:16


 


penicillin G Allergy  Swelling Verified 04/03/19 10:16














PMH/Surg Hx/FS Hx/Imm Hx


Endocrine/Hematology History: Reports: Hx Diabetes - Type 2, Hx Thyroid Disease 

- on meds


Cardiovascular History: Reports: Hx Congestive Heart Failure, Hx Coronary 

Artery Disease, Hx Hypertension - ON MEDICATION, Other Cardiovascular Problems/

Disorders - hx of cardiomyopathy


Respiratory History: 


   Denies: Hx Asthma, Hx Chronic Obstructive Pulmonary Disease (COPD)


GI History: Reports: Hx Gastroesophageal Reflux Disease


 History: Reports: Hx Kidney Infection, Hx Kidney Stones


Musculoskeletal History: Reports: Hx Arthritis - back, hands, feet


Sensory History: Reports: Hx Contacts or Glasses


   Denies: Hx Hearing Aid


Opthamlomology History: Reports: Hx Contacts or Glasses


Psychiatric History: Reports: Hx Anxiety





- Surgical History


Surgery Procedure, Year, and Place: stent


Infectious Disease History: No


Infectious Disease History: 


   Denies: Traveled Outside the [place] in Last 30 Days





- Family History


Known Family History: Positive: Cardiac Disease, Hypertension, Diabetes, Other 

- cancer 


   Negative: Renal Disease, Seizure Disorder





- Social History


Alcohol Use: None


Substance Use Type: Reports: None


Smoking Status (MU): Former Smoker


Amount Used/How Often: 1/2ppd


Length of Time of Smoking/Using Tobacco: 17yrs total had stop period of time


Have You Smoked in the Last Year: Yes





Review of Systems


Constitutional: Other - POS: decreased PO intake, decreased appetite


Positive: Chills.  Negative: Fever


Negative: Chest Pain


Negative: Shortness Of Breath


Positive: Nausea


Musculoskeletal: Other - POS: back pain


All Other Systems Reviewed And Are Negative: Yes





Physical Exam





- Summary


Physical Exam Summary: 





VITAL SIGNS: Reviewed.


GENERAL: Patient is a well-developed and morbidly obese female who is lying 

comfortable in the stretcher. Patient is not in any acute respiratory distress.


HEAD AND FACE: No signs of trauma. No ecchymosis, hematomas or skull 

depressions. No sinus tenderness.


EYES: PERRLA, EOMI x 2, No injected conjunctiva, no nystagmus.


EARS: Hearing grossly intact. Ear canals and tympanic membranes are within 

normal limits.


MOUTH: Oropharynx within normal limits.


NECK: Supple, trachea is midline, no adenopathy, no JVD, no carotid bruit, no c-

spine tenderness, neck with full ROM.


CHEST: Symmetric, no tenderness at palpation


LUNGS: Clear to auscultation bilaterally. No wheezing or crackles.


CVS: Regular rate and rhythm, S1 and S2 present, no murmurs or gallops 

appreciated.


ABDOMEN: Soft, non-tender. No signs of distention. No rebound and no guarding. 

Bowel sounds are normal. Pt has an  umbilical hernia. 


EXTREMITIES: FROM in all major joints, no edema, no cyanosis or clubbing.


NEURO: Alert and oriented x 3. No acute neurological deficits. Speech is normal 

and follows commands.


SKIN: Dry and warm


Triage Information Reviewed: Yes


Vital Signs On Initial Exam: 


 Initial Vitals











Temp Pulse Resp BP Pulse Ox


 


 97.7 F   82   16   91/60   100 


 


 04/04/19 19:53  04/04/19 19:53  04/04/19 19:53  04/04/19 19:53  04/04/19 19:53











Vital Signs Reviewed: Yes





Diagnostics





- Vital Signs


 Vital Signs











  Temp Pulse Resp BP Pulse Ox


 


 04/04/19 19:53  97.7 F  82  16  91/60  100














- Laboratory


Result Diagrams: 


 04/04/19 21:05





 04/04/19 21:05


Lab Statement: Any lab studies that have been ordered have been reviewed, and 

results considered in the medical decision making process.





- EKG


  ** 22:28


Cardiac Rate: NL - at 76 bpm


EKG Rhythm: Sinus Rhythm


Summary of EKG Findings: Nonspecific T wave changes.





Diabetic Course/Dx





- Course


Assessment/Plan: Pt is a 54 y/o female, with hx of DM type 2, who presents to 

the ED c/o low blood glucose today. Pt reports her sugar keeps dropping down 

today despite taking Metformin. She only took 1 pill today and normally takes 2 

pills.  Pt reports passing a kidney stone 3 days ago on Monday night at home, 

although she did not see it.  She went to Urgent Care yesterday and was dx 

kidney infection and was given Ciprofloxacin, only took 1 dose this morning.  

She reports decreased appetite, decreased PO intake, nausea, back pain, and 

chills.  Pt had a cat scan yesterday at [facility] that was essentialy 

negative.  Labs show WBC of 16.1, sodium of 126, potassium of 5.7, anion gap is 

14, creatinine of 1.54, glucose of 54, AST of 72.  Urinalysis is consistent 

with UTI.  In the ED course the pt was given IV fluids, reglan, protonix, 

Toradol, Dextrose, Sodium bicarbonate, Insulin, Rocephin, calcium gluconate.  

Discussed the case with Dr. Woods, hospitalist, who accepted the pt for 

admission.





- Diagnoses


Provider Diagnoses: 


 Sepsis secondary to UTI, Hypoglycemia








- Physician Notifications


Discussed Care Of Patient With: Shaye Woods - hospitalist


Time Discussed With Above Provider: 22:01


Instructed by Provider To: Admit As Inpatient





Discharge





- Sign-Out/Discharge


Documenting (check all that apply): Patient Departure - [place] [facility]


Patient Received Moderate/Deep Sedation with Procedure: No





- Discharge Plan


Condition: Stable


Disposition: ADMITTED TO [facility]


Referrals: 


Delvin Jackson MD [Primary Care Provider] - 





- Attestation Statements


Document Initiated by Scribe: Yes


Documenting Scribe: Kyra Romero


Provider For Whom Scribe is Documenting (Include Credential): Hanh Ratliff MD


Scribe Attestation: 


Kyra HARDWICK, scribed for Hanh Ratliff MD on 04/04/19 at 2239. 


Status of Scribe Document: Ready

## 2019-04-05 VITALS — SYSTOLIC BLOOD PRESSURE: 114 MMHG | DIASTOLIC BLOOD PRESSURE: 60 MMHG

## 2019-04-05 LAB
ALBUMIN SERPL BCG-MCNC: 3.3 G/DL (ref 3.2–5.2)
ALBUMIN/GLOB SERPL: 1.3 {RATIO} (ref 1–3)
ALP SERPL-CCNC: 55 U/L (ref 34–104)
ALT SERPL W P-5'-P-CCNC: 16 U/L (ref 7–52)
ANION GAP SERPL CALC-SCNC: 9 MMOL/L (ref 2–11)
AST SERPL-CCNC: 18 U/L (ref 13–39)
BASOPHILS # BLD AUTO: 0 10^3/UL (ref 0–0.2)
BUN SERPL-MCNC: 22 MG/DL (ref 6–24)
BUN/CREAT SERPL: 17.2 (ref 8–20)
CALCIUM SERPL-MCNC: 8.4 MG/DL (ref 8.6–10.3)
CHLORIDE SERPL-SCNC: 98 MMOL/L (ref 101–111)
EOSINOPHIL # BLD AUTO: 0.1 10^3/UL (ref 0–0.6)
GLOBULIN SER CALC-MCNC: 2.6 G/DL (ref 2–4)
GLUCOSE SERPL-MCNC: 89 MG/DL (ref 70–100)
HCO3 SERPL-SCNC: 23 MMOL/L (ref 22–32)
HCT VFR BLD AUTO: 31 % (ref 33–41)
HGB BLD-MCNC: 10.5 G/DL (ref 12–16)
LYMPHOCYTES # BLD AUTO: 1.1 10^3/UL (ref 1–4.8)
MCH RBC QN AUTO: 25 PG (ref 27–31)
MCHC RBC AUTO-ENTMCNC: 33 G/DL (ref 31–36)
MCV RBC AUTO: 74 FL (ref 80–97)
MONOCYTES # BLD AUTO: 0.9 10^3/UL (ref 0–0.8)
NEUTROPHILS # BLD AUTO: 9.2 10^3/UL (ref 1.5–7.7)
NRBC # BLD AUTO: 0 10^3/UL
NRBC BLD QL AUTO: 0
PLATELET # BLD AUTO: 148 10^3/UL (ref 150–450)
POTASSIUM SERPL-SCNC: 3.3 MMOL/L (ref 3.5–5)
PROT SERPL-MCNC: 5.9 G/DL (ref 6.4–8.9)
RBC # BLD AUTO: 4.24 10^6 /UL (ref 3.7–4.87)
SODIUM SERPL-SCNC: 130 MMOL/L (ref 135–145)
WBC # BLD AUTO: 11.3 10^3/UL (ref 3.5–10.8)

## 2019-04-05 RX ADMIN — SODIUM CHLORIDE, SODIUM LACTATE, POTASSIUM CHLORIDE, AND CALCIUM CHLORIDE SCH MLS/HR: 600; 310; 30; 20 INJECTION, SOLUTION INTRAVENOUS at 11:07

## 2019-04-05 RX ADMIN — SODIUM CHLORIDE, SODIUM LACTATE, POTASSIUM CHLORIDE, AND CALCIUM CHLORIDE SCH MLS/HR: 600; 310; 30; 20 INJECTION, SOLUTION INTRAVENOUS at 00:54

## 2019-04-05 NOTE — HP
CC:  Delvin Jackson MD *

 

HISTORY AND PHYSICAL:

 

DATE OF ADMISSION:  04/04/19

 

TIME OF EVALUATION:  2200.

 

PRIMARY CARE PHYSICIAN:  Delvin Jackson MD

 

CHIEF COMPLAINT:  Low blood sugar.

 

HISTORY OF PRESENT ILLNESS:  This is a 53-year-old female with past medical 
history of morbid obesity and kidney stones, who initially presented to urgent 
care on the 3rd for concern of fevers, chills, and flank pain.  The patient 
states she passed a kidney stone on the evening of Monday, the 1st, because she 
was having left flank pain that traveled down to her groin.  She had an episode 
of vomiting and she has passed kidney stones in the past and this ______ like.  
She followed up at urgent care on the 3rd for concern for low-grade fever, 
chills, nausea, and intermittent groin pain.  At that time in urgent care, she 
was diagnosed with urinary tract infection, started on ciprofloxacin 500 mg 
p.o. b.i.d. which she has been taking. She has also been taking average of 
Advil 400 mg per day for the pain.  Today, she continues to have subjective 
fever and chills.  Her concern was her glucose was 47 which she has never had 
issues with low blood glucose in the past.  She has been nauseated.  The flank 
pain has resolved but she has some low back pain.  No dysuria but decrease in 
urine output with headache and decrease in appetite.  No diarrhea. No cold 
symptoms.  No chest pain or shortness of breath.  Otherwise, review of systems 
is negative.  In the emergency room, the patient had labs.  Findings concerning 
for urinary tract infection, which she was given 2 L of IV fluids, Protonix 40 
mg, Reglan 10 mg, Toradol 15 mg, amp of D50 with regular insulin, calcium 
gluconate, and was going to be started on ceftriaxone, but switched to cefepime
, and referred to the hospitalist service for further evaluation.

 

PAST MEDICAL HISTORY:

1.  History of morbid obesity.

2.  History of nephrolithiasis.

3.  Diabetes, not on insulin.

4.  Hypertension.

5.  History of coronary artery disease, status post PCI at [facility].

6.  Hyperlipidemia.

7.  GERD.

8.  History of cardiomyopathy.

9.  Hypothyroidism.

 

MEDICATIONS:

1.  Atorvastatin 10 mg daily.

2.  Aspirin 81 mg daily.

3.  Tylenol 650 mg every 6 hours as needed.

4.  Advil 400 mg as needed.

5.  Glyburide/metformin 2.5/500 one tab p.o. b.i.d.

6.  Ciprofloxacin 500 mg p.o. b.i.d.  This was started on yesterday, the 3rd.

7.  Coreg 25 mg p.o. b.i.d.

8.  Bumex 1 mg p.o. daily.

9.  Pyridium 100 mg p.o. t.i.d., started on 04/03/19.

10.  Omeprazole 20 mg daily.

11.  Lisinopril 40 mg daily.

12.  Synthroid 100 mcg daily.

13.  Amlodipine 10 mg daily.

14.  Brilinta 90 mg p.o. b.i.d.

 

ALLERGIES:  CLARITHROMYCIN, PENICILLIN.

 

FAMILY HISTORY:  Significant for coronary artery disease.

 

SOCIAL HISTORY:  The patient lives alone.  She is independent of her ADLs.  Her 
brother who is her healthcare proxy lives next door.  She smokes a few 
cigarettes per week.  No alcohol or illicit drug use.  She works as an 
.  Code status is full code.

 

REVIEW OF SYSTEMS:  A 14-point review of systems as mentioned in the HPI, 
otherwise negative.

 

                               PHYSICAL EXAMINATION

 

GENERAL:  No acute distress, resting comfortably.

 

VITAL SIGNS:  Temp 97.7, pulse rate is 80, respiratory rate is 16, oxygen 
saturation 97% on room air, blood pressure 125/67.

 

HEENT:  Head:  Normocephalic.  Pupils equal and reactive.  Anicteric.  
Oropharynx: Mucous membranes dry.

 

NECK:  Supple.  No lymphadenopathy.

 

RESPIRATORY:  Clear to auscultation.  No wheezes, rhonchi, or rales.

 

CARDIAC:  Regular rate and rhythm.  Soft systolic murmur heard throughout.

 

ABDOMEN:  Morbidly obese.  Soft, nontender.  No CVA tenderness.

 

EXTREMITIES:  Trace pretibial edema.

 

NEUROLOGIC:  Alert and oriented x3.  No gross focal neurologic deficits.

 

DIAGNOSTIC STUDIES/LAB DATA: White count 16.1, hemoglobin 12.1, hematocrit 37, 
platelets 183,000.  Sodium 126, potassium 5.7, chloride 93, bicarb 19.  BUN 25, 
creatinine 1.54.  Glucose 54, repeat 192.  Lactic acid 2.  Total bili 1.3. AST 
is 72.  Urine shows +2 protein, +1 blood, positive nitrites, white cells.  



The patient did have a CAT scan done on the 3rd that did not show any 
hydronephrosis, no stones, anterior abdominal wall hernia with omental fat 
infiltration noted.  No change.

 

ASSESSMENT AND PLAN:  This is a 53-year-old female with past medical history of 
nephrolithiasis, recent diagnosis of urinary tract infection, presents to the 
emergency room for hypoglycemia.

 

1.  Hypoglycemia.  I suspect this is in the setting of worsening renal failure, 
on metformin.  Plan:  We will hold her oral agents, continue IV fluids, and 
place her on lispro sliding scale as needed and check her glucose q.4 hours 
overnight.

2.  Acute kidney injury.  Assessment:  Could be in the setting of a recent 
kidney stone passed.  Also urinary tract infection.  The patient has also been 
taking Advil.  Plan:  We will check a renal ultrasound and check a FENa.  We 
will hold her glyburide and metformin and her lisinopril, renally dose her meds 
and repeat her renal function in the morning.

3.  Pyuria.  Assessment:  The patient with a white count and pyuria concerning 
for urinary tract infection.  She was on ciprofloxacin for 24 hours.  We will 
change it to cefepime for better pseudomonas coverage.  Follow up her urine 
cultures. Continue on gentle IV fluids.

4.  Chronic medical problems.  We will resume her home medications with the 
exceptions as mentioned above.  We will hold her amlodipine as her blood 
pressures are soft.  Continue on her Coreg.  Also, hold her Bumex in the 
setting of worsening renal failure and volume depletion.

5.  Hyperkalemia.  No evidence of peak T-waves.  We will give her a dose of 
patiromer.  Repeat her labs in the morning.

6.  FEN.  Place the patient on a regular diet with IV fluids.

7.  DVT prophylaxis.  The patient scores moderate risk.  We will place her on 
heparin subcu t.i.d.

 

TIME SPENT:  Greater than 60 minutes was spent doing this history and physical, 
more than half the time spent in direct patient contact.

 

 

 

[id number]/[facility] #: [id number]

SHAD

## 2019-04-05 NOTE — DS
CC:  Dr. Jackson*

 

DISCHARGE SUMMARY:

 

DATE OF ADMISSION:  04/04/19

 

DATE OF DISCHARGE:  04/05/19

 

HISTORY OF PRESENT ILLNESS:  This 53-year-old woman came with the chief 
complaint of low blood sugar.  I am not sure where the blood sugar was taken.  
The patient is diabetic and has a glucometer at home.  She does not use insulin 
at home.  She takes a combination of glyburide and metformin at home.  She 
usually gets her blood sugars in the 120s.  She did get shaky.  She did have a 
blood sugar of 54 in the hospital on 04/04/19 at 2105 hours.  She did get 
insulin, glucose, and calcium in the emergency room for hyperkalemia.  She also 
received patiromer.  I am not completely sure about the sequence of the insulin 
glucose treatment and the blood sugars.

 

The patient was started on ciprofloxacin for urinary tract infection on 04/03/
19. That urine culture showed mixed jose of uncertain significant and 
urinalysis shows pyuria and nitrites but no leukocyte esterase.  She has no 
further urinary tract symptoms.  She has a history of kidney stones.  
Ultrasound of the kidneys was negative for stones or hydronephrosis.  She did 
have some renal insufficiency.

 

I note a year ago her creatinine was 0.71.  On admission, her creatinine was 
1.54. On the day of discharge, it was 1.28.  She was feeling quite well, eating 
and drinking normally with no  symptoms or other symptoms and was anxious to 
go home.

 

I have instructed her not to take glyburide metformin combination.  She also 
was receiving less blood pressure medicine than at home as well as her blood 
pressure was well controlled here on much less medication.  She will have a BMP 
on Monday, 04/08/19, and follow up with Dr. Jackson next week.

 

FINAL DIAGNOSES:

1.  Hypoglycemia.

2.  Diabetes.

3.  Acute kidney injury.

4.  Possible urinary tract infection.

5.  History of kidney stones with negative ultrasound today.

6.  Morbid obesity. DISCHARGE MEDICATIONS:

1.  Omeprazole 20 mg daily.

2.  Levothyroxine 100 mcg daily.

3.  Carvedilol 25 mg b.i.d.

4.  Ticagrelor 90 mg b.i.d.

5.  Atorvastatin 10 mg daily.

6.  Aspirin 81 mg daily.

7.  Bumetanide 1 mg daily.

8.  Acetaminophen 650 mg every 6 hours p.r.n.

9.  Ciprofloxacin 500 mg b.i.d. to complete a prescription.

10.  Phenazopyridine 100 mg t.i.d.

 

Medications discontinued:

1.  Lisinopril.

2.  Amlodipine.

3.  Glyburide.

4.  Metformin.

 

The patient will have a BMP on 04/08/19.

 

DISPOSITION ON DISCHARGE:  Home.

 

CONDITION ON DISCHARGE:  Improved.

 

 [id number]/[facility] #: [id number]

MTDD

## 2019-11-13 ENCOUNTER — HOSPITAL ENCOUNTER (EMERGENCY)
Dept: HOSPITAL 25 - ED | Age: 54
Discharge: HOME | End: 2019-11-13
Payer: COMMERCIAL

## 2019-11-13 VITALS — SYSTOLIC BLOOD PRESSURE: 150 MMHG | DIASTOLIC BLOOD PRESSURE: 82 MMHG

## 2019-11-13 DIAGNOSIS — S61.011A: Primary | ICD-10-CM

## 2019-11-13 DIAGNOSIS — K21.9: ICD-10-CM

## 2019-11-13 DIAGNOSIS — E07.9: ICD-10-CM

## 2019-11-13 DIAGNOSIS — F17.200: ICD-10-CM
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
## 2019-11-13 DIAGNOSIS — Z23: ICD-10-CM

## 2019-11-13 DIAGNOSIS — I11.0: ICD-10-CM

## 2019-11-13 DIAGNOSIS — Z79.899: ICD-10-CM

## 2019-11-13 DIAGNOSIS — I50.9: ICD-10-CM

## 2019-11-13 DIAGNOSIS — E11.9: ICD-10-CM

## 2019-11-13 DIAGNOSIS — F41.9: ICD-10-CM

## 2019-11-13 DIAGNOSIS — Z88.0: ICD-10-CM

## 2019-11-13 DIAGNOSIS — I25.10: ICD-10-CM

## 2019-11-13 DIAGNOSIS — Z88.1: ICD-10-CM
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
## 2019-11-13 PROCEDURE — 99282 EMERGENCY DEPT VISIT SF MDM: CPT

## 2019-11-13 PROCEDURE — 90471 IMMUNIZATION ADMIN: CPT

## 2019-11-13 PROCEDURE — 12001 RPR S/N/AX/GEN/TRNK 2.5CM/<: CPT

## 2019-11-13 PROCEDURE — 90715 TDAP VACCINE 7 YRS/> IM: CPT

## 2019-11-13 RX ADMIN — LIDOCAINE HYDROCHLORIDE ONE MG: 10 INJECTION, SOLUTION EPIDURAL; INFILTRATION; INTRACAUDAL; PERINEURAL at 19:23

## 2019-11-13 RX ADMIN — TETANUS TOXOID, REDUCED DIPHTHERIA TOXOID AND ACELLULAR PERTUSSIS VACCINE, ADSORBED ONE ML: 5; 2.5; 8; 8; 2.5 SUSPENSION INTRAMUSCULAR at 18:55

## 2019-11-13 NOTE — XMS REPORT
Summary of Care

 Created on:2019



Patient:Leanna Macdonald

Sex:Female

:1965

External Reference #:[id number]





Demographics







 Address  [address]



   [place] [address]

 

 Mobile Phone  [phone]

 

 Home Phone  [phone]

 

 Work Phone  [phone]

 

 Email Address  [email or web address]

 

 Preferred Language  English

 

 Marital Status  Not  or 

 

 [employer] Affiliation  Unknown

 

 Race  White

 

 Ethnic Group  Not  or 









Author







 Organization  [facility]

 

 Address  [address] [place] [address]









Support







 Name  Relationship  Address  Phone

 

 Sahil Macdonald  Unavailable  [address]  [phone]



     [place] [address]  

 

 Kolby Macdonald  Unavailable  [address]  [phone]



     [place] [address]  

 

 Adams Macdonald  Unavailable  [address]  [phone]



     [place] [address]  









Care Team Providers







 Name  Role  Phone

 

 Delvin Jackson MD  Primary Care Provider  [phone]

 

 Octavio Hui OD  Primary Ophthalmologist/Optometrist  [phone]









Reason for Visit







 Reason  Comments

 

 Check Up  UTI symptoms for a few weeks that is now affecting her back.







Encounter Details







 Date  Type  Department  Care Team  Description

 

 2019  Office Visit  [place] Patrizia Wilkinson,  Dysuria (Primary Dx)
;



     Practice



  FNP



  Uncontrolled type 2 diabetes mellitus without complication, without long-term 
current use of insulin ([facility]);



     [address]



  [address]



  Myalgia



     [place] [address]



  [place] [address]



  



     [phone] (Fax)  







Allergies







 Active Allergy  Reactions  Severity  Noted Date  Comments

 

 Clarithromycin  GI Reaction  Low  2015  Extremely bad diarrhea,



         medicine taste in mouth

 

 Penicillin G Potassium      2008  



documented as of this encounter (statuses as of 2019)



Medications







 Medication  Sig  Dispensed  Refills  Start Date  End Date  Status

 

 Blood Glucose  by Does not apply  1 Kit  0  2015    Active



 Monitoring Suppl  route.          



 (BLOOD GLUCOSE METER)  Brand:accucheck          



 Does not apply Kit  Dx:E11.9          



   non-Insulin          



   dependent   Test          



   Blood Glucose 1          



   time(s) A DAY          

 

 Glucose Blood  1 Each by Does  100 Each  5  11/15/2016    Active



 (ACCU-CHEK COMPACT  not apply route          



 PLUS) In Vitro Strip  DAILY.          

 

 aspirin 81 MG Oral  Take 1 Tab by  30 Tab  0  2018    Active



 Tab EC  mouth DAILY.          

 

 bumetanide (BUMEX) 1  Take 1 Tab by  90 Tab  3  2018    Active



 MG Oral Tab  mouth DAILY.          

 

 lisinopril (PRINIVIL,  Take 1 Tab by  90 Tab  3  2019    Active



 ZESTRIL) 40 MG Oral  mouth DAILY.          



 TabIndications:            



 Essential            



 hypertension, benign            

 

 metFORMIN HCL 1000 MG  Take 1 Tab by  180 Tab  3  2019    Active



 Oral Tab  mouth TWICE          



   DAILY.          

 

 Omeprazole delayed  Take 20 mg by  90 Cap  3  2019    Active



 rel cap 20 MG Oral  mouth DAILY.          



 CAPSULE DELAYED            



 RELEASE            

 

 sertraline (ZOLOFT)  Take 1 Tab by  90 Tab  3  2019    Active



 50 MG Oral Tab  mouth DAILY. 1 in          



   am 5 d then 2 in          



   am          

 

 levothyroxine  Take 1 Tab by  360 Tab  0  2019  04/10/2020  Active



 (SYNTHROID\UNITHROID)  mouth BEFORE          



 100 MCG Oral Tab  BREAKFAST.          

 

 carvedilol (COREG) 25  Take 1 Tab by  180 Tab  3  2019    Active



 MG Oral Tab  mouth TWICE          



   DAILY.          

 

 amLodipine (NORVASC)  Take 1 Tab by  90 Tab  3  2019    Active



 10 MG Oral  mouth DAILY.          



 TabIndications:            



 Essential            



 hypertension            

 

 Rosuvastatin Calcium  Take 1 Tab by  90 Tab  3  2019    Active



 (CRESTOR) 20 MG Oral  mouth DAILY.          



 Tab            

 

 ciprofloxacin (CIPRO)  Take 1 Tab by  14 Tab  0  2019  Active



 500 MG Oral  mouth TWICE DAILY          



 TabIndications:  for 7 days.          



 Dysuria            



documented as of this encounter (statuses as of 2019)



Active Problems







 Problem  Noted Date

 

 Heart failure with preserved ejection fraction  2018

 

 ASHD (arteriosclerotic heart disease)  2018

 

 Mixed hyperlipidemia  2018

 

 Umbilical hernia without obstruction and without gangrene  10/30/2015

 

 Uncontrolled type 2 diabetes mellitus without complication, without  2006



 long-term current use of insulin  









 Overview: 







 Yearly eye exam with Dr. Card.









 Other specified hypothyroidism  2006

 

 Morbid obesity  2000

 

 Essential hypertension, benign  



documented as of this encounter (statuses as of 2019)



Resolved Problems







 Problem  Noted Date  Resolved Date

 

 Nonischemic cardiomyopathy  2006



documented as of this encounter (statuses as of 2019)



Immunizations







 Name  Administration Dates  Next Due

 

 Influenza (IM) Preservative Free  10/30/2014, 01/10/2011  



documented as of this encounter



Social History







 Tobacco Use  Types  Packs/Day  Years Used  Date

 

 Current Some Day Smoker  Cigarettes      









 Smokeless Tobacco: Never Used      









 Comments: quit , had restarted  / quit one month ago









 Alcohol Use  Drinks/Week  oz/Week  Comments

 

 No  0 Standard drinks or equivalent  0.0  









 Sex Assigned at Birth  Date Recorded

 

 Not on file  









 Job Start Date  Occupation  Industry

 

 Not on file  Not on file  Not on file









 Travel History  Travel Start  Travel End









 No recent travel history available.



documented as of this encounter



Last Filed Vital Signs







 Vital Sign  Reading  Time Taken  Comments

 

 Blood Pressure  137/68  2019  2:56 PM EDT  

 

 Pulse  66  2019  2:56 PM EDT  

 

 Temperature  36.8 

  2019  2:56 PM EDT  



   C (98.3 

    



   F)    

 

 Respiratory Rate  -  -  

 

 Oxygen Saturation  -  -  

 

 Inhaled Oxygen Concentration  -  -  

 

 Weight  134.3 kg (296 lb)  2019  2:56 PM EDT  

 

 Height  154.9 cm (5' 1")  2019  2:56 PM EDT  

 

 Body Mass Index  55.93  2019  2:56 PM EDT  



documented in this encounter



Patient Instructions

Patient InstructionsPatrizia Vaughn FNP - 2019  3:00 PM EDTIncrease 
fluids

Can try taking 2 Prilosec a day for 4-5 days

Use Cipro if symptoms worsen

Follow up with Dr Jackson soonElectronically signed by Patrizia Vaughn FNP at   3:27 PM EDT

documented in this encounter



Progress Notes

Patrizia Vaughn FNP - 2019  3:00 PM EDTFormatting of this note might be 
different from the original.

PATIENT:  Leanna Macdonald

MRN:  [id number]

:  1965

DATE OF SERVICE:  2019



CHIEF COMPLAINT:

Chief Complaint

Patient presents with

 Check Up

  UTI symptoms for a few weeks that is now affecting her back.



Subjective

HISTORY OF PRESENT ILLNESS:

Leanna Macdonald is a 54-y.o. female.

HPI

Body aches for several days. Had burning with urination recently - got better 
with cranberry juice. States BG elevated - consistently over 200 and sometimes 
over 300 recently



Past Medical History:

Diagnosis Date

 BREAST DISORDERS NEC 2004

 Diabetes mellitus (HCC) 2006

 Disorder of function of stomach

 Endocrine problem

 Essential hypertension, benign

 Fat necrosis of breast 2004

 GERD (gastroesophageal reflux disease)

 History of breast surgery

 hx left robert bx

 Hypertension

 Hypothyroidism 2006

 LIPOMA SKIN NEC 2004

 Morbid obesity (HCC) 2000

 Nonischemic cardiomyopathy (HCC) 2006

 Nulliparity

 Osteoarthritis

 Other postprocedural status(V45.89)

 left breast bx  negative

 Postmenopausal



Family History

Problem Relation Age of Onset

 Cancer Mother

     Breast

 Breast Cancer Mother

     65

 Breast Cancer Maternal Aunt



Current Outpatient Medications

Medication Sig

 amLodipine (NORVASC) 10 MG Oral Tab Take 1 Tab by mouth DAILY.

 aspirin 81 MG Oral Tab EC Take 1 Tab by mouth DAILY.

 Blood Glucose Monitoring Suppl (BLOOD GLUCOSE METER) Does not apply Kit 
by Does not apply route. Brand:accuchLumeta   Dx:E11.9   non-Insulin dependent   
Test Blood Glucose 1 time(s) A DAY

 bumetanide (BUMEX) 1 MG Oral Tab Take 1 Tab by mouth DAILY.

 carvedilol (COREG) 25 MG Oral Tab Take 1 Tab by mouth TWICE DAILY.

 ciprofloxacin (CIPRO) 500 MG Oral Tab Take 1 Tab by mouth TWICE DAILY 
for 7 days.

 Glucose Blood (ACCU-CHEK COMPACT PLUS) In Vitro Strip 1 Each by Does not 
apply route DAILY.

 levothyroxine (SYNTHROID\UNITHROID) 100 MCG Oral Tab Take 1 Tab by mouth 
BEFORE BREAKFAST.

 lisinopril (PRINIVIL, ZESTRIL) 40 MG Oral Tab Take 1 Tab by mouth DAILY.

 metFORMIN HCL 1000 MG Oral Tab Take 1 Tab by mouth TWICE DAILY.

 Omeprazole delayed rel cap 20 MG Oral CAPSULE DELAYED RELEASE Take 20 mg 
by mouth DAILY.

 Rosuvastatin Calcium (CRESTOR) 20 MG Oral Tab Take 1 Tab by mouth DAILY.

 sertraline (ZOLOFT) 50 MG Oral Tab Take 1 Tab by mouth DAILY. 1 in am 5 
d then 2 in am



No current facility-administered medications for this visit.



Allergies

Allergen Reactions

 Penicillin [Penicillin G Potassium]

 Biaxin [Clarithromycin] GI Reaction

  Extremely bad diarrhea, medicine taste in mouth



Social History



Socioeconomic History

 Marital status: Single

  Spouse name: Not on file

 Number of children: Not on file

 Years of education: Not on file

 Highest education level: Not on file

Occupational History

 Not on file

Social Needs

 Financial resource strain: Not on file

 Food insecurity:

  Worry: Not on file

  Inability: Not on file

 Transportation needs:

  Medical: Not on file

  Non-medical: Not on file

Tobacco Use

 Smoking status: Current Some Day Smoker

  Types: Cigarettes

 Smokeless tobacco: Never Used

 Tobacco comment: quit , had restarted  / quit one month ago

Substance and Sexual Activity

 Alcohol use: No

  Alcohol/week: 0.0 standard drinks

 Drug use: No

 Sexual activity: Never

Lifestyle

 Physical activity:

  Days per week: Not on file

  Minutes per session: Not on file

 Stress: Not on file

Relationships

 Social connections:

  Talks on phone: Not on file

  Gets together: Not on file

  Attends [employer] service: Not on file

  Active member of club or organization: Not on file

  Attends meetings of clubs or organizations: Not on file

  Relationship status: Not on file

 Intimate partner violence:

  Fear of current or ex partner: Not on file

  Emotionally abused: Not on file

  Physically abused: Not on file

  Forced sexual activity: Not on file

Other Topics Concern

 Back Care Not Asked

 Bike Helmet Not Asked

 Blood Transfusions Not Asked

 Caffeine Concern No

 Exercise No

  Comment: sedentary lifestyle

 Hobby Hazards Not Asked

 International Travel Not Asked

  Service Not Asked

 Occupational Exposure Not Asked

 Seat Belt Not Asked

 Self-Exams Not Asked

 Sleep Concern Yes

  Comment: difficulty falling asleep.

 Special Diet Yes

  Comment: low carbo diet

 Stress Concern Yes

 Weight Concern Yes

  Comment: goal weight 150 lbs.

Social History Narrative

 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

 Lives alone in [facility]

 Father lives nearby.











REVIEW OF SYSTEMS:

Review of Systems

Constitutional: Positive for malaise/fatigue. Negative for chills and fever.

Respiratory: Negative for shortness of breath.

Cardiovascular: Negative for chest pain and palpitations.

Gastrointestinal: Positive for abdominal pain, diarrhea and nausea. Negative 
for vomiting.

Genitourinary: Positive for dysuria. Negative for frequency, hematuria and 
urgency.

Musculoskeletal: Positive for back pain and myalgias.



Objective

PHYSICAL EXAM:

VITALS:  /68  | Pulse 66  | Temp 98.3 F (36.8 C)  | Ht 5' 1" (
1.549 m)  | Wt 296 lb (134.3 kg)  | BMI 55.93 kg/m  Body mass index is 
55.93 kg/m.

Physical Exam

Constitutional: She is oriented to person, place, and time. Vital signs are 
normal. She appears well-developed and well-nourished.

HENT:

Head: Normocephalic and atraumatic.

Mouth/Throat: Uvula is midline and oropharynx is clear and moist.

Eyes: Pupils are equal, round, and reactive to light. EOM are normal.

Neck: Normal range of motion. No JVD present.

Cardiovascular: Normal rate and regular rhythm.

Pulmonary/Chest: Effort normal and breath sounds normal.

Abdominal: Soft. Bowel sounds are normal. She exhibits no distension and no 
mass. There is no hepatosplenomegaly. There is tenderness. There is no rigidity
, no rebound and no guarding. No hernia.

Musculoskeletal: Normal range of motion.

Lymphadenopathy:

  She has no cervical adenopathy.

Neurological: She is alert and oriented to person, place, and time. No cranial 
nerve deficit or sensory deficit.

Skin: Skin is warm and dry. No pallor.

Vitals reviewed.





ASSESSMENT / IMPRESSION:

  ICD-9-CM ICD-10-CM

1. Dysuria 788.1 R30.0 URINE DIP MANUAL (AMB POCT)

   ciprofloxacin (CIPRO) 500 MG Oral Tab

2. Uncontrolled type 2 diabetes mellitus without complication, without long-
term current use of insulin (HCC) 250.02 E11.65

3. Myalgia 729.1 M79.10





  Plan

Increase fluids

Can try taking 2 Prilosec a day for 4-5 days

Use Cipro if symptoms worsen

Follow up with Dr Manuel banks



Author:  TATE Mcneal 2019 15:36Electronically signed by Patrizia Vaughn FNP at 2019  3:37 PM EDTdocumented in this encounter



Plan of Treatment







 Date  Type  Specialty  Care Team  Description

 

 10/08/2019  Office Visit  Family Practice  Stephanie Samaniego FNP



  



       [address] [place] [address] [phone] (Fax)  

 

 2020  Orders Only  Cardiology    

 

 2020  Office Visit  Cardiology  Keith Mitchell MD



  



       [facility] [place] [address] [phone] (Fax)  









 Name  Type  Priority  Associated Diagnoses  Order Schedule

 

 URINE DIP MANUAL (AMB POCT)  POCT  Routine  Dysuria  Ordered: 2019









 Health Maintenance  Due Date  Last Done  Comments

 

 PNEUMOCOCCAL 0-64 YRS (1 of  1973    



 1 - PPSV23)      

 

 PAP SMEAR  2009  

 

 ZOSTER IMMUNIZATION SERIES  2017    



 (1 of 2)      

 

 DEPRESSION SCREENING  2019  

 

 FOOT EXAM  05/15/2019  05/15/2018, 05/15/2018,  



     05/15/2018, Additional  



     history exists  

 

 INFLUENZA VACCINE (#1)  2019  10/30/2014, 01/10/2011  

 

 HEMOGLOBIN A1C  10/11/2019  2019, 05/15/2018,  



     2018, Additional  



     history exists  

 

 Diabetic Eye Exam  2020  

 

 MAMMOGRAM (SCREENING)  2020, 2018,  



     2016, Additional  



     history exists  

 

 LIPID DISORDER SCREENING  2020, 2019,  



     05/15/2018, Additional  



     history exists  

 

 COLONOSCOPY SCREENING  2021, 2016,  



     2015 (Postponed),  



     Additional history  



     exists  

 

 HPV IMMUNIZATION SERIES  Aged Out    No longer eligible



       based on patient's age



       to complete this topic

 

 MENINGOCOCCAL VACCINE IMM  Aged Out    No longer eligible



       based on patient's age



       to complete this topic



documented as of this encounter



Goals







 Goal  Patient Goal  Associated  Recent  Patient-Stated?  Author



   Type  Problems  Progress    

 

 Blood Pressure  Blood Pressure    137/68  No  Danette,



 < 140/90      (2019    TATE rBown



       2:56 PM EDT)    









 Note: 



This is an individualized treatment (blood pressure) goal for Leanna Macdonald:



 Displayed above (on the left) is your goal for blood pressure control.  Your 
most recent blood pressure is also shown above, on the right.



 You should try to achieve blood pressures that are lower than your goal listed 
above (on the left).









 Weight increase vs. 18 mo  CHF    31 (2019  2:56 PM EDT)  No  Delvin Jackson MD



 min (lbs) < 5          









 Note: 



This is an individualized treatment (congestive heart failure, CHF) goal for 
Leanna Macdonald:



 Displayed above (on the right) is how many pounds you are in excess of your 
lowest weight over the past 18 months.  Note that lower numbers are better.  
Excessive weight gain often indicates fluid reten



 tion and worsening heart failure.  You should contact your doctor immediately 
if the above number is too high (above your goal, the number on the left).









 Glycohemoglobin A1c < 7.0  Diabetes    6.4 (2019 11:56  Stephanie Jacome FNP



       AM EDT)    









 Note: 



This is an individualized treatment (diabetes control, HgbA1C) goal for 
Leanna Macdonald:



 Displayed above is your progress towards your HgbA1C goal.  Your goal is shown 
above (on the left); your most recent HgbA1C is shown on the right.  Note that 
lower numbers are better.









 Weight loss vs. 18 mo  Lifestyle    0 (2019  2:56 PM  No  Stephanie Samaniego FNP



 max (lbs) >= 10      EDT)    









 Note: 



This is an individualized lifestyle goal for Leanna Macdonald:



 Your body mass index (BMI) is more than 30.  You should lose weight.  A 
reasonable starting goal is to lose 10 pounds.



 Displayed above is how many pounds you have lost thus far towards your 10 
pound weight loss goal.









 Keep immunizations current  Lifestyle      Stephanie Jacome FNP









 Note: 



This is an individualized lifestyle goal for Leanna Macdonald:



 Please be sure to keep up-to-date on recommended immunizations.  For example, 
this would include a yearly influenza vaccine.



 Immunization status can be seen by looking at the Health Maintenance sections 
of your [facility], Plan of Care, and any After Visit Summaries.









 Consume a no-added-salt diet  Lifestyle      Delvin France MD









 Note: 



This is an individualized lifestyle goal for Leanna Macdonald:



 Please do not add additional salt to your food.  Additional salt may lead to 
fluid retention and worsen your congestive heart failure.









 Take all prescribed medications as  Self-management      Stephanie Jacome FNP



 directed          









 Note: 



This is an individualized self-management goal for Leanna Macdonald:



 Please take all prescribed medications as directed.



 1.  Do not skip doses.  If you cannot afford your medications, talk with your 
doctor.



 2.  Use a pill reminder system such as a pill box if needed.  Your pharmacist 
can help you with this.



 3.  Contact your Pharmacy 5 days before your medication runs out.  If you 
cannot take your medications for any reasons, talk with your doctor.



 4.  Please bring all of your medication bottles and inhalers (or a list of all 
your medications/inhalers) with you to every visit.



 Potential barriers to meeting all of your care plan goals will continue to be 
addressed on an ongoing basis.









 Check your weight daily  Self-management      Delvin France MD









 Note: 



This is an individualized self-management goal for Leanna Macdonald:



 Please check your weight daily.  Refer to the accompanying CHF treatment goal 
and call your doctor immediately for further instructions on how to respond to 
unexpected weight gain.



documented as of this encounter



Implants







 Implanted  Type  Area    Device  Shelf  Model /



         Identifier  Expiration  Serial /



           Date  Lot

 

 3.5/18 Chris Segal - [id number]    N/A: LAD  GUZMAN      [id number] /



 Implanted: Qty: 1 on 2018 by Lisandro Camarena MD at [facility]       /



             [id number]



documented as of this encounter



Results

Not on filedocumented in this encounter



Visit Diagnoses







 Diagnosis

 

 Dysuria - Primary

 

 Uncontrolled type 2 diabetes mellitus without complication, without long-term 
current



 use of insulin (HCC)

 

 Myalgia







 Mylagia and myositis, unspecified



documented in this encounter



Insurance







 Payer  Benefit Plan /  Subscriber ID  Effective Dates  Phone  Address  Type



   Group          

 

 JO[employer]  [employer]  xxxxxxxxxxxx  2015-Present      [employer]









 Guarantor Name  Account Type  Relation to  Date of  Phone  Billing



     Patient  Birth    Address

 

 Maryjane Macdonald  Personal/Family    1965  [phone]  [address]







 [address]        (Home)



  [place]



         [phone] [id number]



         (Work)  



documented as of this encounter

## 2019-11-13 NOTE — ED
Laceration/Wound HPI





- HPI Summary


HPI Summary: 


54 year old female presents with right thumb laceration today.  She cut it on a 

mandolin. the area of her thumb almost fell off. the area continues to bleed. 

she is unsure when her last tetanus was. she is diabetic.  She has full ROM of 

her finger. no foreign body in wound.  she is right handed. 





- History of Current Complaint


Stated Complaint: CUT FINGER PER PT


Time Seen by Provider: 11/13/19 18:27


Pain Intensity: 3





- Additional Pertinent History


Primary Care Physician: [id number]





- Allergy/Home Medications


Allergies/Adverse Reactions: 


 Allergies











Allergy/AdvReac Type Severity Reaction Status Date / Time


 


clarithromycin Allergy  Diarrhea Verified 11/13/19 18:26


 


penicillin G Allergy  Swelling Verified 11/13/19 18:26














PMH/Surg Hx/FS Hx/Imm Hx


Endocrine/Hematology History: Reports: Hx Diabetes - Type 2, Hx Thyroid Disease 

- on meds


   Denies: Hx Anticoagulant Therapy, Hx Anemia, Hx Unexplained Bleeding


Cardiovascular History: Reports: Hx Congestive Heart Failure, Hx Coronary 

Artery Disease, Hx Hypertension - ON MEDICATION, Other Cardiovascular Problems/

Disorders - hx of cardiomyopathy


   Denies: Hx Aneurysm, Hx Angina, Hx Cardiac Arrest, Hx Congenital Heart 

Disease, Hx Embolism, Hx Hypercholesterolemia, Hx Pacemaker/ICD, Hx Peripheral 

Vascular Disease, Hx Rheumatic Fever


Respiratory History: 


   Denies: Hx Asthma, Hx Bronchopulmonary Dysplasia, Hx Chronic Obstructive 

Pulmonary Disease (COPD), Hx Cystic Fibrosis, Hx Pneumonia, Hx Pulmonary Edema, 

Hx Pulmonary Embolism, Hx Seasonal Allergies, Hx Sleep Apnea


GI History: Reports: Hx Gastroesophageal Reflux Disease


   Denies: Hx Cirrhosis, Hx Crohn's Disease, Hx Diverticulosis, Hx Gall Bladder 

Disease, Hx Gastrointestinal Bleed, Hx Hiatal Hernia, Hx Irritable Bowel, Hx 

Jaundice, Hx Obstructive Bowel, Hx Ileostomy, Hx Pyloric Stenosis, Hx Ulcer


   Comment Only: Other GI Disorders - umbilical hernia


 History: Reports: Hx Kidney Infection, Hx Kidney Stones


   Denies: Hx Acute Renal Failure, Hx Benign Prostatic Hyperplasia, Hx Chronic 

Renal Failure, Hx Dialysis


Musculoskeletal History: Reports: Hx Arthritis - back, hands, feet


Sensory History: Reports: Hx Contacts or Glasses


   Denies: Hx Cataracts, Hx Eye Injury, Hx Glaucoma, Hx Legally Blind, Hx 

Macular Degeneration, Hx Vision Problem, Hx Deafness, Hx Hearing Aid, Hx 

Hearing Problem


Opthamlomology History: Reports: Hx Contacts or Glasses


   Denies: Hx Cataracts, Hx Eye Injury, Hx Glaucoma, Hx Legally Blind, Hx 

Macular Degeneration, Hx Vision Problem


Neurological History: Reports: Hx Headaches


   Denies: Hx Dementia, Hx Migraine, Hx Nerve Disease, Hx Seizures, Hx 

Transient Ischemic Attacks (TIA)


Psychiatric History: Reports: Hx Anxiety


   Denies: Hx Oppositional Defiance Disorder, Hx Depression, Hx Panic Disorder, 

Hx Post Traumatic Stress Disorder, Hx Inpatient Treatment, Hx Community Mental 

Health Tx, Hx Schizophrenia, Hx Bipolar Disorder, Hx Suicide Attempt, Hx of 

Violent Episodes Against Others





- Surgical History


Surgery Procedure, Year, and Place: stent


Infectious Disease History: No


Infectious Disease History: 


   Denies: Hx Clostridium Difficile, Hx Hepatitis, Hx of Known/Suspected MRSA, 

Hx Shingles, Hx Tuberculosis, Hx Known/Suspected VRE, Hx Known/Suspected VRSA, 

Traveled Outside the [place] in Last 30 Days





- Family History


Known Family History: Positive: Cardiac Disease, Hypertension, Diabetes, Other 

- cancer 


   Negative: Renal Disease, Seizure Disorder





- Social History


Alcohol Use: Rare


Substance Use Type: Reports: None


Smoking Status (MU): Current Some Day Smoker


Amount Used/How Often: 1/2ppd


Length of Time of Smoking/Using Tobacco: 17yrs total had stop period of time


Have You Smoked in the Last Year: Yes





Review of Systems


Negative: Fever


Negative: Chest Pain


Negative: Shortness Of Breath


Positive: Other - right thumb laceration


All Other Systems Reviewed And Are Negative: Yes





Physical Exam


Triage Information Reviewed: Yes


Vital Signs On Initial Exam: 


 Initial Vitals











Temp Pulse Resp BP Pulse Ox


 


 97.5 F   83   19   146/87   96 


 


 11/13/19 18:23  11/13/19 18:23  11/13/19 18:23  11/13/19 18:23  11/13/19 18:23











Vital Signs Reviewed: Yes


Appearance: Positive: Well-Appearing


Skin: Positive: Other - 2cm by 1/2cm laceration near right thumb nail


Head/Face: Positive: Normal Head/Face Inspection


Eyes: Positive: Normal, Conjunctiva Clear


ENT: Positive: Pharynx normal


Respiratory/Lung Sounds: Positive: Clear to Auscultation, Breath Sounds Present


Cardiovascular: Positive: Normal, RRR


Musculoskeletal: Positive: Strength/ROM Intact - right thumb, Other - capillary 

refill<2 secs


Neurological: Positive: Normal


Psychiatric: Positive: Normal





Procedures





- Sedation


Patient Received Moderate/Deep Sedation with Procedure: No





- Laceration/Wound Repair


  ** 1


Location: Other - right thumb


Description: Irregular


Anesthesia: Digital, 1.0%


Length, Depth and Shape: 2cm by 1/2cm


Irrigated w/ Saline (ccs): 500


Closure: Single Layer


Suture Type: Prolene


Number of Sutures: 2


Sterile Dressing Applied?: Yes - telfa, coband, xeroform, surgicel





Diagnostics





- Vital Signs


 Vital Signs











  Temp Pulse Resp BP Pulse Ox


 


 11/13/19 18:23  97.5 F  83  19  146/87  96














- Laboratory


Lab Statement: Any lab studies that have been ordered have been reviewed, and 

results considered in the medical decision making process.





Re-Evaluation





- Re-Evaluation


  ** First Eval


Re-Evaluation Time: 19:40


Comment: no bleeding





Laceration Repair Course/Dx





- Course


Course Of Treatment: 54 year old female presents with right thumb laceration 

today.  She cut it on a mandolin. the area of her thumb almost fell off. the 

area continues to bleed. she is unsure when her last tetanus was. she is 

diabetic.  She has full ROM of her finger. no foreign body in wound.  she is 

right handed. on exam has 2cm by 1/2cm laceration near nail of right thumb that 

continues to bleed. placed 2 sutures and area continues to bleed so placed 

pressure dressing with surgicel, xeroform, coband and telfa. told change 

dressing in two days. told follow up with primary for wound check. patient 

understand and agrees with plan.





- Differential Dx


Differental Diagnoses: Abrasion, Avulsion, Laceration





- Clinical Impression


Provider Diagnoses: 


 Finger laceration








Discharge ED





- Sign-Out/Discharge


Documenting (check all that apply): Patient Departure





- Discharge Plan


Condition: Good


Disposition: HOME


Patient Education Materials:  Care For Your Stitches (ED)


Referrals: 


Delvin Jackson MD [Primary Care Provider] - 


Additional Instructions: 


Keep area in pressure dressing for 48 hours, after 48 hours check for sign of 

infection and rewrap with pressure dressing for another 24 hours


suture removal in 8-10 days


Follow up with primary within 5 days


Return to ED if develop any signs of infection such as fever, spreading redness

, or pus formation or if bleed through pressure dressing or any new or 

worsening symptoms





- Billing Disposition and Condition


Condition: GOOD


Disposition: Home